# Patient Record
Sex: FEMALE | Race: WHITE | HISPANIC OR LATINO | Employment: UNEMPLOYED | URBAN - METROPOLITAN AREA
[De-identification: names, ages, dates, MRNs, and addresses within clinical notes are randomized per-mention and may not be internally consistent; named-entity substitution may affect disease eponyms.]

---

## 2016-10-21 LAB — PHYSICIAN READ PAP  881411: NORMAL

## 2017-08-31 ENCOUNTER — NON-PROVIDER VISIT (OUTPATIENT)
Dept: OBGYN | Facility: CLINIC | Age: 37
End: 2017-08-31
Payer: MEDICAID

## 2017-08-31 DIAGNOSIS — Z32.02 PREGNANCY EXAMINATION OR TEST, NEGATIVE RESULT: ICD-10-CM

## 2017-08-31 LAB
INT CON NEG: NEGATIVE
INT CON POS: POSITIVE
POC URINE PREGNANCY TEST: NEGATIVE

## 2017-08-31 PROCEDURE — 81025 URINE PREGNANCY TEST: CPT | Performed by: OBSTETRICS & GYNECOLOGY

## 2017-09-29 ENCOUNTER — GYNECOLOGY VISIT (OUTPATIENT)
Dept: OBGYN | Facility: CLINIC | Age: 37
End: 2017-09-29
Payer: MEDICAID

## 2017-09-29 VITALS
DIASTOLIC BLOOD PRESSURE: 90 MMHG | WEIGHT: 219 LBS | HEIGHT: 59 IN | BODY MASS INDEX: 44.15 KG/M2 | SYSTOLIC BLOOD PRESSURE: 140 MMHG

## 2017-09-29 DIAGNOSIS — N92.1 MENORRHAGIA WITH IRREGULAR CYCLE: ICD-10-CM

## 2017-09-29 PROCEDURE — 99203 OFFICE O/P NEW LOW 30 MIN: CPT | Performed by: OBSTETRICS & GYNECOLOGY

## 2017-09-29 NOTE — PROGRESS NOTES
" Lina Alfaro37 y.o.  female presents for abnormal uterine bleeding  Patient is complaining of twice monthly periods lasting between 5-7 days. Patient states she has large clots about the size of a tampon her first 4 days. She states she has to change for tampon every 30 minutes during the first few days of her cycle. Patient denies breakthrough bleeding or spotting. She states she has some pain the first few days of her menstrual cycle which she uses ibuprofen with good relief from her discomfort.    ROS: Patient is feeling well. No dyspnea or chest pain on exertion. No Abdominal pain, change in bowel habits, black or bloody stools. No urinary sx. GYN ROS:no breast pain or new or enlarging lumps on self exam.  No neurological complaints.  Past Medical History:   Diagnosis Date   • Allergy     see list.     None  Allergy:   Nkda [no known drug allergy]    LMP:       Patient's last menstrual period was 2017. .     Menstrual History: menses irregular: See history of present illness      Pap history, the patient denies abnormal Pap smears and denies   sexually transmitted diseases    Mammo:    Objective : The patient appears well, alert and oriented x 3, in no acute distress.  Blood pressure 140/90, height 1.499 m (4' 11\"), weight 99.3 kg (219 lb), last menstrual period 2017, not currently breastfeeding.  HEENT Exam: EOMI, ANGELIQUE, no adenopathy or thyromegaly.  Lungs: Clear to Auscultation   Cor: S1 and S2 normal, no murmurs, or rubs   Abdomen: Soft without tenderness, guarding mass or organomegaly.  Extremities: No edema, pulses equal  Neurological: Normal No focal signs  Breast Exam:deferred  Pelvic: negative findings: external genitalia normal, Bartholin's glands, urethra, [place]'s glands negative, vaginal mucosa normal, cervix clear, normal sized uterus, adnexae negative, exam limited by body habitus    Lab:No results found for this or any previous visit (from the past 336 " hour(s)).    Assessment: Abnormal uterine bleeding    Plan  CBC, TSH  Transvaginal pelvic ultrasound  Endometrial biopsy

## 2017-10-19 ENCOUNTER — HOSPITAL ENCOUNTER (OUTPATIENT)
Dept: LAB | Facility: MEDICAL CENTER | Age: 37
End: 2017-10-19
Attending: OBSTETRICS & GYNECOLOGY
Payer: MEDICAID

## 2017-10-19 DIAGNOSIS — N92.1 MENORRHAGIA WITH IRREGULAR CYCLE: ICD-10-CM

## 2017-10-19 LAB
BASOPHILS # BLD AUTO: 0.9 % (ref 0–1.8)
BASOPHILS # BLD: 0.06 K/UL (ref 0–0.12)
EOSINOPHIL # BLD AUTO: 0.14 K/UL (ref 0–0.51)
EOSINOPHIL NFR BLD: 2.1 % (ref 0–6.9)
ERYTHROCYTE [DISTWIDTH] IN BLOOD BY AUTOMATED COUNT: 51.2 FL (ref 35.9–50)
HCT VFR BLD AUTO: 36.3 % (ref 37–47)
HGB BLD-MCNC: 11.7 G/DL (ref 12–16)
IMM GRANULOCYTES # BLD AUTO: 0.02 K/UL (ref 0–0.11)
IMM GRANULOCYTES NFR BLD AUTO: 0.3 % (ref 0–0.9)
LYMPHOCYTES # BLD AUTO: 2.08 K/UL (ref 1–4.8)
LYMPHOCYTES NFR BLD: 31.7 % (ref 22–41)
MCH RBC QN AUTO: 27.7 PG (ref 27–33)
MCHC RBC AUTO-ENTMCNC: 32.2 G/DL (ref 33.6–35)
MCV RBC AUTO: 86 FL (ref 81.4–97.8)
MONOCYTES # BLD AUTO: 0.47 K/UL (ref 0–0.85)
MONOCYTES NFR BLD AUTO: 7.2 % (ref 0–13.4)
NEUTROPHILS # BLD AUTO: 3.8 K/UL (ref 2–7.15)
NEUTROPHILS NFR BLD: 57.8 % (ref 44–72)
NRBC # BLD AUTO: 0 K/UL
NRBC BLD AUTO-RTO: 0 /100 WBC
PLATELET # BLD AUTO: 285 K/UL (ref 164–446)
PMV BLD AUTO: 9.7 FL (ref 9–12.9)
RBC # BLD AUTO: 4.22 M/UL (ref 4.2–5.4)
TSH SERPL DL<=0.005 MIU/L-ACNC: 3.18 UIU/ML (ref 0.3–3.7)
WBC # BLD AUTO: 6.6 K/UL (ref 4.8–10.8)

## 2017-10-19 PROCEDURE — 36415 COLL VENOUS BLD VENIPUNCTURE: CPT

## 2017-10-19 PROCEDURE — 84443 ASSAY THYROID STIM HORMONE: CPT

## 2017-10-19 PROCEDURE — 85025 COMPLETE CBC W/AUTO DIFF WBC: CPT

## 2017-10-24 ENCOUNTER — HOSPITAL ENCOUNTER (OUTPATIENT)
Dept: RADIOLOGY | Facility: MEDICAL CENTER | Age: 37
End: 2017-10-24
Attending: OBSTETRICS & GYNECOLOGY
Payer: MEDICAID

## 2017-10-24 DIAGNOSIS — N92.1 MENORRHAGIA WITH IRREGULAR CYCLE: ICD-10-CM

## 2017-10-24 PROCEDURE — 76830 TRANSVAGINAL US NON-OB: CPT

## 2017-11-07 ENCOUNTER — HOSPITAL ENCOUNTER (OUTPATIENT)
Facility: MEDICAL CENTER | Age: 37
End: 2017-11-07
Attending: OBSTETRICS & GYNECOLOGY
Payer: MEDICAID

## 2017-11-07 ENCOUNTER — GYNECOLOGY VISIT (OUTPATIENT)
Dept: OBGYN | Facility: CLINIC | Age: 37
End: 2017-11-07
Payer: MEDICAID

## 2017-11-07 VITALS
BODY MASS INDEX: 44.76 KG/M2 | DIASTOLIC BLOOD PRESSURE: 74 MMHG | HEIGHT: 59 IN | SYSTOLIC BLOOD PRESSURE: 120 MMHG | WEIGHT: 222 LBS

## 2017-11-07 DIAGNOSIS — Z12.4 SCREENING FOR MALIGNANT NEOPLASM OF CERVIX: ICD-10-CM

## 2017-11-07 DIAGNOSIS — N93.9 ABNORMAL UTERINE BLEEDING: ICD-10-CM

## 2017-11-07 DIAGNOSIS — Z11.51 SCREENING FOR HPV (HUMAN PAPILLOMAVIRUS): ICD-10-CM

## 2017-11-07 LAB
INT CON NEG: NEGATIVE
INT CON POS: POSITIVE
PATHOLOGY CONSULT NOTE: NORMAL
POC URINE PREGNANCY TEST: NEGATIVE

## 2017-11-07 PROCEDURE — 88175 CYTOPATH C/V AUTO FLUID REDO: CPT

## 2017-11-07 PROCEDURE — 87624 HPV HI-RISK TYP POOLED RSLT: CPT

## 2017-11-07 PROCEDURE — 81025 URINE PREGNANCY TEST: CPT | Performed by: OBSTETRICS & GYNECOLOGY

## 2017-11-07 PROCEDURE — 88305 TISSUE EXAM BY PATHOLOGIST: CPT

## 2017-11-07 PROCEDURE — 58100 BIOPSY OF UTERUS LINING: CPT | Performed by: OBSTETRICS & GYNECOLOGY

## 2017-11-07 NOTE — NON-PROVIDER
EMB today  LMP: 10-9-17  Phone: [phone]  Pharmacy verified with patient  Urine HCG today - negative  US done on 10-24-17  EMB consent signed

## 2017-11-07 NOTE — PROGRESS NOTES
37-year-old  1-2 here today for endometrial biopsy secondary to menorrhagia. Patient is still complaining of regular 28 day menstrual cycles but she complains of heavy bleeding with clotting and cramps. She denies breakthrough bleeding. No other complaints today except the patient is kind of trying to get pregnant. She states that she is not preventing pregnancy nor is she actively attempting to conceive. She had an IUD until  during which time she had no abnormal vaginal bleeding.    Discussed with patient findings on ultrasonography which confirm a proximally 2 cm submucosal fibroid, possible adenomyosis, endometrial stripe 11.3 mm, normal ovaries    Indications for endometrial biopsy are discussed with patient. Risks associated with biopsy are discussed with patient. Informed consent is signed.  Urine pregnancy test is noted to be negative  Patient is placed in dorsal lithotomy position a speculum was inserted into the vagina  The cervix was evaluated and cleansed with Betadine swabs x3  Tenaculum was applied to the anterior lip of the cervix.  Dilators were used to access the endometrial cavity  Endometrial biopsy Pipelle was passed through the cervical os into the endometrial cavity x3  good sample is obtained  Endometrial cavity sounds to 10 cm  Instruments are removed from the vagina and cervix, hemostasis is achieved with silver nitrate  Patient tolerated the procedure well    Also discussed with patient today were results of CBC which shows mild anemia, recommend beginning iron    Pap smears also performed as patient states she has had no recent Pap smear  Patient follow-up in 3 weeks for review of results and further discussion of secondary infertility  Reviewed briefly with patient timed intercourse for increased chances of pregnancy

## 2017-11-08 LAB
CYTOLOGY REG CYTOL: NORMAL
HPV HR 12 DNA CVX QL NAA+PROBE: NEGATIVE
HPV16 DNA SPEC QL NAA+PROBE: NEGATIVE
HPV18 DNA SPEC QL NAA+PROBE: NEGATIVE
SPECIMEN SOURCE: NORMAL

## 2017-11-13 ENCOUNTER — NON-PROVIDER VISIT (OUTPATIENT)
Dept: OBGYN | Facility: CLINIC | Age: 37
End: 2017-11-13
Payer: MEDICAID

## 2017-11-13 DIAGNOSIS — Z32.01 POSITIVE URINE PREGNANCY TEST: ICD-10-CM

## 2017-11-13 LAB
INT CON NEG: NEGATIVE
INT CON POS: POSITIVE
POC URINE PREGNANCY TEST: POSITIVE

## 2017-11-13 PROCEDURE — 81025 URINE PREGNANCY TEST: CPT | Performed by: OBSTETRICS & GYNECOLOGY

## 2017-11-28 ENCOUNTER — GYNECOLOGY VISIT (OUTPATIENT)
Dept: OBGYN | Facility: CLINIC | Age: 37
End: 2017-11-28
Payer: MEDICAID

## 2017-11-28 ENCOUNTER — HOSPITAL ENCOUNTER (OUTPATIENT)
Dept: LAB | Facility: MEDICAL CENTER | Age: 37
End: 2017-11-28
Attending: OBSTETRICS & GYNECOLOGY
Payer: MEDICAID

## 2017-11-28 VITALS
SYSTOLIC BLOOD PRESSURE: 122 MMHG | DIASTOLIC BLOOD PRESSURE: 72 MMHG | HEIGHT: 59 IN | WEIGHT: 222 LBS | BODY MASS INDEX: 44.76 KG/M2

## 2017-11-28 DIAGNOSIS — N93.8 DUB (DYSFUNCTIONAL UTERINE BLEEDING): ICD-10-CM

## 2017-11-28 LAB
B-HCG SERPL-ACNC: ABNORMAL MIU/ML (ref 0–5)
IN CLINIC OB SCAN: NORMAL

## 2017-11-28 PROCEDURE — 36415 COLL VENOUS BLD VENIPUNCTURE: CPT

## 2017-11-28 PROCEDURE — 84702 CHORIONIC GONADOTROPIN TEST: CPT

## 2017-11-28 PROCEDURE — 99213 OFFICE O/P EST LOW 20 MIN: CPT | Mod: 25 | Performed by: OBSTETRICS & GYNECOLOGY

## 2017-11-28 PROCEDURE — 76830 TRANSVAGINAL US NON-OB: CPT | Performed by: OBSTETRICS & GYNECOLOGY

## 2017-11-29 NOTE — PROGRESS NOTES
"Lina Alfaro,  37 y.o.  female presents today with a C/O of :amenorrhea. Pt   Patient's last menstrual period was 10/09/2017.       Subjective : Nausea/Vomiting: No:  Abdominal /pelvic cramping : No :   vaginal bleeding:No  Patient had endometrial biopsy and negative UPT on  followed by positive urine pregnancy test a few days later.       GYN ROS:  no breast pain or new or enlarging lumps on self exam, no discharge or pelvic pain      Past Medical History:   Diagnosis Date   • Allergy     see list.       History reviewed. No pertinent surgical history.    Current Birth control:  none    OB History    Para Term  AB Living   5 2 1 1 2 2   SAB TAB Ectopic Molar Multiple Live Births   1 1       2      # Outcome Date GA Lbr Anthony/2nd Weight Sex Delivery Anes PTL Lv   5 Current            4  04/15/11 29w5d  1.446 kg (3 lb 3 oz) M Vag-Spont  Y ALICIA   3 TAB 2009 18w0d    TAB         Birth Comments: 2 day procedure, no complications.   2 Term 02 39w2d  2.778 kg (6 lb 2 oz) F Vag-Spont EPI  ALICIA      Birth Comments: no complications. ROM for 1 day not having ucs labor induced. baby stayed in hospital for 1 wk pt GBS +   1 SAB  9w0d    SAB         Birth Comments: No D&C              Allergy:      Nkda [no known drug allergy]    Exam;    /72   Ht 1.499 m (4' 11\")   Wt 100.7 kg (222 lb)   LMP 10/09/2017   BMI 44.84 kg/m²   Well-developed well-nourished female in no apparent distress  Heart regular rate and rhythm  Lungs clear to auscultation bilaterally  Breasts bilaterally normal with no dominant masses  Abdomen is soft and nontender    Normal external female genitalia  Cervix is closed thick and high  Uterus  12 centimeters  Adnexa are bilaterally nontender with no dominant masses    Lab.    No results found for this or any previous visit (from the past 336 hour(s)).  Ultrasound :     First trimester findings: Intrauterine gestational sac seen: " yes  Gestational sac summary: yolk sac seen  Adnexa: no masses seen uterus enlarged consistent with fibroids Probe type: vaginal  Ultrasound was performed and read by me      Assessment:    Intrauterine gestational sac is seen probable early IUP although can't confirm    Plan:  1 week  Beta hCG ×2

## 2017-11-30 ENCOUNTER — HOSPITAL ENCOUNTER (OUTPATIENT)
Dept: LAB | Facility: MEDICAL CENTER | Age: 37
End: 2017-11-30
Attending: OBSTETRICS & GYNECOLOGY
Payer: MEDICAID

## 2017-11-30 DIAGNOSIS — N93.8 DUB (DYSFUNCTIONAL UTERINE BLEEDING): ICD-10-CM

## 2017-11-30 LAB — B-HCG SERPL-ACNC: ABNORMAL MIU/ML (ref 0–5)

## 2017-11-30 PROCEDURE — 36415 COLL VENOUS BLD VENIPUNCTURE: CPT

## 2017-11-30 PROCEDURE — 84702 CHORIONIC GONADOTROPIN TEST: CPT

## 2017-12-07 ENCOUNTER — GYNECOLOGY VISIT (OUTPATIENT)
Dept: OBGYN | Facility: CLINIC | Age: 37
End: 2017-12-07
Payer: MEDICAID

## 2017-12-07 VITALS — SYSTOLIC BLOOD PRESSURE: 90 MMHG | WEIGHT: 221 LBS | BODY MASS INDEX: 44.64 KG/M2 | DIASTOLIC BLOOD PRESSURE: 54 MMHG

## 2017-12-07 DIAGNOSIS — O20.0 THREATENED ABORTION: ICD-10-CM

## 2017-12-07 LAB — IN CLINIC OB SCAN: NORMAL

## 2017-12-07 PROCEDURE — 76830 TRANSVAGINAL US NON-OB: CPT | Performed by: OBSTETRICS & GYNECOLOGY

## 2017-12-07 PROCEDURE — 99213 OFFICE O/P EST LOW 20 MIN: CPT | Mod: 25 | Performed by: OBSTETRICS & GYNECOLOGY

## 2017-12-07 NOTE — PROGRESS NOTES
Lina Mouna Alfaro,  37 y.o.  female presents today with a C/O of :amenorrhea. Pt   Patient's last menstrual period was 10/09/2017.       Subjective : Nausea/Vomiting: No:  Abdominal /pelvic cramping : No :   vaginal bleeding:No         GYN ROS:  no vaginal bleeding, no discharge or pelvic pain      Past Medical History:   Diagnosis Date   • Allergy     see list.       No past surgical history on file.    Current Birth control:  none    OB History    Para Term  AB Living   5 2 1 1 2 2   SAB TAB Ectopic Molar Multiple Live Births   1 1       2      # Outcome Date GA Lbr Anthony/2nd Weight Sex Delivery Anes PTL Lv   5 Current            4  04/15/11 29w5d  1.446 kg (3 lb 3 oz) M Vag-Spont  Y ALICIA   3 TAB 2009 18w0d    TAB         Birth Comments: 2 day procedure, no complications.   2 Term 02 39w2d  2.778 kg (6 lb 2 oz) F Vag-Spont EPI  ALICIA      Birth Comments: no complications. ROM for 1 day not having ucs labor induced. baby stayed in hospital for 1 wk pt GBS +   1 SAB  9w0d    SAB         Birth Comments: No D&C              Allergy:      Nkda [no known drug allergy]    Exam;    BP (!) 90/54   Wt 100.2 kg (221 lb)   LMP 10/09/2017   BMI 44.64 kg/m²   Well-developed well-nourished female in no apparent distress  Heart regular rate and rhythm  Lungs clear to auscultation bilaterally  Breasts bilaterally normal with no dominant masses  Abdomen is soft and nontender    Normal external female genitalia  Cervix is closed thick and high  Uterus  12 centimeters  Adnexa are bilaterally nontender with no dominant masses    Lab.    Recent Results (from the past 336 hour(s))   POCT US - In Clinic OB Scan    Collection Time: 17  4:27 PM   Result Value Ref Range    In Clinic OB Scan     HCG QUANTITATIVE    Collection Time: 17  4:30 PM   Result Value Ref Range    Bhcg 10,509.1 (H) 0.0 - 5.0 mIU/mL   HCG QUANTITATIVE    Collection Time: 17  3:32 PM   Result Value Ref  Range    Bhcg 15,939.7 (H) 0.0 - 5.0 mIU/mL     Ultrasound :     First trimester findings: Intrauterine gestational sac seen: yes  Gestational sac summary: fetal pole seen, yolk sac seen, fetal cardiac activity, EGA: 7 weeks + 2 days  Probe type: vaginal  Ultrasound was performed and read by me      Assessment:    Intrauterine gestation at 7 weeks and 2 /7 days, with EDC 7/25/18    Plan:  2 weeks  Repeat ultrasonography  Patient will need Eileen Bustam[facility] OB

## 2017-12-22 ENCOUNTER — INITIAL PRENATAL (OUTPATIENT)
Dept: OBGYN | Facility: CLINIC | Age: 37
End: 2017-12-22
Payer: MEDICAID

## 2017-12-22 ENCOUNTER — HOSPITAL ENCOUNTER (OUTPATIENT)
Facility: MEDICAL CENTER | Age: 37
End: 2017-12-22
Attending: OBSTETRICS & GYNECOLOGY
Payer: MEDICAID

## 2017-12-22 VITALS — BODY MASS INDEX: 44.64 KG/M2 | SYSTOLIC BLOOD PRESSURE: 102 MMHG | WEIGHT: 221 LBS | DIASTOLIC BLOOD PRESSURE: 70 MMHG

## 2017-12-22 DIAGNOSIS — Z11.3 SCREENING FOR VENEREAL DISEASE: ICD-10-CM

## 2017-12-22 DIAGNOSIS — O09.521 ELDERLY MULTIGRAVIDA IN FIRST TRIMESTER: ICD-10-CM

## 2017-12-22 DIAGNOSIS — O09.90 SUPERVISION OF HIGH RISK PREGNANCY, ANTEPARTUM: ICD-10-CM

## 2017-12-22 LAB
APPEARANCE UR: NORMAL
BILIRUB UR STRIP-MCNC: NORMAL MG/DL
COLOR UR AUTO: NORMAL
GLUCOSE UR STRIP.AUTO-MCNC: NEGATIVE MG/DL
KETONES UR STRIP.AUTO-MCNC: NEGATIVE MG/DL
LEUKOCYTE ESTERASE UR QL STRIP.AUTO: NEGATIVE
NITRITE UR QL STRIP.AUTO: NEGATIVE
PH UR STRIP.AUTO: 6.5 [PH] (ref 5–8)
PROT UR QL STRIP: NEGATIVE MG/DL
RBC UR QL AUTO: NEGATIVE
SP GR UR STRIP.AUTO: 1.01
UROBILINOGEN UR STRIP-MCNC: NORMAL MG/DL

## 2017-12-22 PROCEDURE — 87491 CHLMYD TRACH DNA AMP PROBE: CPT

## 2017-12-22 PROCEDURE — 87591 N.GONORRHOEAE DNA AMP PROB: CPT

## 2017-12-22 PROCEDURE — 81002 URINALYSIS NONAUTO W/O SCOPE: CPT | Performed by: OBSTETRICS & GYNECOLOGY

## 2017-12-22 PROCEDURE — 59402 PR NEW OB HIGH RISK: CPT | Performed by: OBSTETRICS & GYNECOLOGY

## 2017-12-22 NOTE — LETTER
Cystic Fibrosis Carrier Testing  Lina Alfaro    The following information is about a blood test that can be done to determine if you and/or your partner carry the gene for cystic fibrosis.    WHAT IS CYSTIC FIBROSIS?  · Cystic fibrosis (CF) is an inherited disease that affects more than 25,000 American children and young adults.  · Symptoms of CF vary but include lung congestion, pneumonia, diarrhea and poor growth.  Most people with CF have severe medical problems and some die at a young age.  Others have so few symptoms they are unaware they have CF.  · CF does not affect intelligence.  · Although there is no cure for CF at this time, scientists are making progress in improving treatment and in searching for a cure.  In the past many people with CF  at a very young age.  Today, many are living into their 20’s and 30’s.    IS THERE A CHANCE MY BABY COULD HAVE CYSTIC FIBROSIS?  · You can have a child with CF even if there is no history in your family (see chart below).  · CF testing can help determine if you are a carrier and at risk to have a child with CF.  Note: if both parents are carriers, there is a 1 in 4 (25%) chance with each pregnancy that they will have a child with CF.  · Carriers have one normal CF gene and one altered CF gene.  · People with CF have two altered CF genes.  · Most people have two normal copies of the CF gene.    Approximate risk that a couple with no family history of cystic fibrosis will have a child with cystic fibrosis:    Ethnic background / Risk     couple:  1 in 2,500   couple:  1 in 15,000            couple:  1 in 8,000     American couple:  1 in 32,000     WHAT TESTING IS AVAILABLE?  · There is a blood test that can be done to find out if you or your partner is a carrier.  · It is important to understand that CF carrier testing does not detect all CF carriers.  · If the test shows that you are both CF carriers, you unborn  baby can be tested to find out if the baby has CF.    HOW MUCH DOES IT COST TO HAVE CYSTIC FIBROSIS CARRIER TESTING?  · Cost and insurance coverage for CF carrier testing vary depending upon the laboratory used and your insurance policy.  · The average cost for CF carrier testing is $300 per person.  · Your genetic counselor can provide you with more information about cystic fibrosis carrier testing.    _____  Yes, I am interested in discussing carrier testing with a genetic counselor.    _____  No, I am not interested in CF carrier testing or in receiving more information about CF carrier testing.      Client signature: ________________________________________  12/22/2017

## 2017-12-22 NOTE — PROGRESS NOTES
Chief complaint, new OB visit    Lina Mouna Alfaro is a 37 y.o.  at 10w4d here today for obstetrical visit.  Patient is without complaints.    She reports no fetal movement.  She denies vaginal bleeding.  She denies rupture of membranes.  She denies contractions.     has High-risk pregnancy;  labor in second trimester with  delivery in third trimester; and Elderly multigravida in first trimester on her problem list.    Past medical history is reviewed and updated  Past surgical history is reviewed and updated  Medications reviewed and updated  Allergies reviewed and updated  Social history reviewed and updated  Family history reviewed and updated    /70   Wt 100.2 kg (221 lb)   LMP 10/09/2017   BMI 44.64 kg/m²   PELVIC EXAM:  Normal external genitalia  Spec: cervix has no lesions, minimal whitish discharge  Vaginal wall no lesions  BME: cervix - closed and firm, non-tender            Uterus - 10 weeks, non-tender            Adnexae - no masses, non-tender      A/P IUP at 10w4d  AFP will send to [facility]  1 hour glucola   Rhogam a pos  GBS   History of  labor patient is a candidate for [place]  Patient needs referral to perinatology for advanced maternal age history of  labor will need early cervical length screening    F/U in 4 weeks

## 2017-12-22 NOTE — PROGRESS NOTES
Pt here today for New OB visit   Planned Pregnancy   Pt started going to the gym and would like to know if that is ok  Good # [phone]  Desires [facility]  Pharmacy verified

## 2017-12-23 DIAGNOSIS — Z11.3 SCREENING FOR VENEREAL DISEASE: ICD-10-CM

## 2017-12-23 LAB
C TRACH DNA SPEC QL NAA+PROBE: NEGATIVE
N GONORRHOEA DNA SPEC QL NAA+PROBE: NEGATIVE
SPECIMEN SOURCE: NORMAL

## 2017-12-27 NOTE — PROGRESS NOTES
Referral faxed to [facility] on 12/27/17.  They will contact patient to schedule appt.  Please check with patient if appt was made/ document. Thank you

## 2018-01-05 ENCOUNTER — TELEPHONE (OUTPATIENT)
Dept: OBGYN | Facility: CLINIC | Age: 38
End: 2018-01-05

## 2018-01-06 ENCOUNTER — HOSPITAL ENCOUNTER (OUTPATIENT)
Dept: LAB | Facility: MEDICAL CENTER | Age: 38
End: 2018-01-06
Attending: OBSTETRICS & GYNECOLOGY
Payer: MEDICAID

## 2018-01-06 DIAGNOSIS — O09.521 ELDERLY MULTIGRAVIDA IN FIRST TRIMESTER: ICD-10-CM

## 2018-01-06 LAB
ABO GROUP BLD: NORMAL
APPEARANCE UR: CLEAR
BACTERIA #/AREA URNS HPF: ABNORMAL /HPF
BASOPHILS # BLD AUTO: 0.3 % (ref 0–1.8)
BASOPHILS # BLD: 0.02 K/UL (ref 0–0.12)
BILIRUB UR QL STRIP.AUTO: NEGATIVE
BLD GP AB SCN SERPL QL: NORMAL
COLOR UR: YELLOW
CULTURE IF INDICATED INDCX: NO UA CULTURE
EOSINOPHIL # BLD AUTO: 0.38 K/UL (ref 0–0.51)
EOSINOPHIL NFR BLD: 6.3 % (ref 0–6.9)
EPI CELLS #/AREA URNS HPF: ABNORMAL /HPF
ERYTHROCYTE [DISTWIDTH] IN BLOOD BY AUTOMATED COUNT: 47.6 FL (ref 35.9–50)
GLUCOSE 1H P 50 G GLC PO SERPL-MCNC: 236 MG/DL (ref 70–139)
GLUCOSE UR STRIP.AUTO-MCNC: NEGATIVE MG/DL
HBV SURFACE AG SER QL: NEGATIVE
HCT VFR BLD AUTO: 36.3 % (ref 37–47)
HGB BLD-MCNC: 11.7 G/DL (ref 12–16)
HIV 1+2 AB+HIV1 P24 AG SERPL QL IA: NON REACTIVE
HYALINE CASTS #/AREA URNS LPF: ABNORMAL /LPF
IMM GRANULOCYTES # BLD AUTO: 0.01 K/UL (ref 0–0.11)
IMM GRANULOCYTES NFR BLD AUTO: 0.2 % (ref 0–0.9)
KETONES UR STRIP.AUTO-MCNC: NEGATIVE MG/DL
LEUKOCYTE ESTERASE UR QL STRIP.AUTO: NEGATIVE
LYMPHOCYTES # BLD AUTO: 1.49 K/UL (ref 1–4.8)
LYMPHOCYTES NFR BLD: 24.5 % (ref 22–41)
MCH RBC QN AUTO: 27.7 PG (ref 27–33)
MCHC RBC AUTO-ENTMCNC: 32.2 G/DL (ref 33.6–35)
MCV RBC AUTO: 86 FL (ref 81.4–97.8)
MICRO URNS: ABNORMAL
MONOCYTES # BLD AUTO: 0.32 K/UL (ref 0–0.85)
MONOCYTES NFR BLD AUTO: 5.3 % (ref 0–13.4)
NEUTROPHILS # BLD AUTO: 3.86 K/UL (ref 2–7.15)
NEUTROPHILS NFR BLD: 63.4 % (ref 44–72)
NITRITE UR QL STRIP.AUTO: NEGATIVE
NRBC # BLD AUTO: 0 K/UL
NRBC BLD-RTO: 0 /100 WBC
PH UR STRIP.AUTO: 6.5 [PH]
PLATELET # BLD AUTO: 310 K/UL (ref 164–446)
PMV BLD AUTO: 9.9 FL (ref 9–12.9)
PROT UR QL STRIP: NEGATIVE MG/DL
RBC # BLD AUTO: 4.22 M/UL (ref 4.2–5.4)
RBC # URNS HPF: ABNORMAL /HPF
RBC UR QL AUTO: ABNORMAL
RH BLD: NORMAL
RUBV AB SER QL: 104.7 IU/ML
SP GR UR STRIP.AUTO: 1.02
TREPONEMA PALLIDUM IGG+IGM AB [PRESENCE] IN SERUM OR PLASMA BY IMMUNOASSAY: NON REACTIVE
UROBILINOGEN UR STRIP.AUTO-MCNC: 0.2 MG/DL
WBC # BLD AUTO: 6.1 K/UL (ref 4.8–10.8)
WBC #/AREA URNS HPF: ABNORMAL /HPF

## 2018-01-06 PROCEDURE — 86900 BLOOD TYPING SEROLOGIC ABO: CPT

## 2018-01-06 PROCEDURE — 86780 TREPONEMA PALLIDUM: CPT

## 2018-01-06 PROCEDURE — 87340 HEPATITIS B SURFACE AG IA: CPT

## 2018-01-06 PROCEDURE — 86762 RUBELLA ANTIBODY: CPT

## 2018-01-06 PROCEDURE — 85025 COMPLETE CBC W/AUTO DIFF WBC: CPT

## 2018-01-06 PROCEDURE — 82950 GLUCOSE TEST: CPT

## 2018-01-06 PROCEDURE — 86901 BLOOD TYPING SEROLOGIC RH(D): CPT

## 2018-01-06 PROCEDURE — 81001 URINALYSIS AUTO W/SCOPE: CPT

## 2018-01-06 PROCEDURE — 86850 RBC ANTIBODY SCREEN: CPT

## 2018-01-06 PROCEDURE — 36415 COLL VENOUS BLD VENIPUNCTURE: CPT

## 2018-01-06 PROCEDURE — 87389 HIV-1 AG W/HIV-1&-2 AB AG IA: CPT

## 2018-01-08 ENCOUNTER — HOSPITAL ENCOUNTER (EMERGENCY)
Facility: MEDICAL CENTER | Age: 38
End: 2018-01-08
Payer: MEDICAID

## 2018-01-08 VITALS
OXYGEN SATURATION: 97 % | WEIGHT: 220.02 LBS | RESPIRATION RATE: 18 BRPM | HEART RATE: 108 BPM | BODY MASS INDEX: 46.18 KG/M2 | DIASTOLIC BLOOD PRESSURE: 79 MMHG | SYSTOLIC BLOOD PRESSURE: 122 MMHG | TEMPERATURE: 97.4 F | HEIGHT: 58 IN

## 2018-01-08 PROCEDURE — 302449 STATCHG TRIAGE ONLY (STATISTIC)

## 2018-01-08 ASSESSMENT — PAIN SCALES - GENERAL: PAINLEVEL_OUTOF10: 3

## 2018-01-09 ENCOUNTER — TELEPHONE (OUTPATIENT)
Dept: OBGYN | Facility: CLINIC | Age: 38
End: 2018-01-09

## 2018-01-09 NOTE — TELEPHONE ENCOUNTER
----- Message from Autumn Botello M.D. sent at 1/7/2018  5:31 PM PST -----  Patient has DM please send to Diabetes clinic for diet and meter  1/9/18@ 10:32am. N/a, msg left for patient to call back.  Patient called back, states that she went to the ER yesterday but decided to leave because it was 6 hrs waiting, she is having vaginal bleeding on and off since Friday. Some lower abdominal cramping, Tylenol helping some. Right now is only spotting and light cramping. Consulted with Dr Botello she advised to schedule patient to see her next Monday meanwhile if she has heavy vaginal bleeding where she is soaking a pad in less than 1 hr will need to go to the ER.

## 2018-01-09 NOTE — ED NOTES
"Patient signed \"Leaving prior to physician contact\" form  Tech encouraged to stay but patient refused.  "

## 2018-01-09 NOTE — ED NOTES
"Chief Complaint   Patient presents with   • Pregnancy     12 weeks.  P:2.   • Vaginal Bleeding     \"I've been spotting since Friday. Then I started bleeding more when I woke up this morning. Then I had a blood clot come out in the toilet and started cramping after that\".     • Abdominal Cramping     mild cramping 3/10     Pt am to triage with above.   Denies saturating pads, was concerned when she saw the clots.   Pt returned to [facility]. Educated on triage process and to inform staff of any changes.     /79   Pulse (!) 108   Temp 36.3 °C (97.4 °F)   Resp 18   Ht 1.473 m (4' 10\")   Wt 99.8 kg (220 lb 0.3 oz)   LMP 10/09/2017   SpO2 97%   BMI 45.98 kg/m²     "

## 2018-01-10 ENCOUNTER — TELEPHONE (OUTPATIENT)
Dept: OBGYN | Facility: CLINIC | Age: 38
End: 2018-01-10

## 2018-01-10 ENCOUNTER — HOSPITAL ENCOUNTER (OUTPATIENT)
Facility: MEDICAL CENTER | Age: 38
End: 2018-01-10
Attending: OBSTETRICS & GYNECOLOGY
Payer: MEDICAID

## 2018-01-10 ENCOUNTER — ROUTINE PRENATAL (OUTPATIENT)
Dept: OBGYN | Facility: CLINIC | Age: 38
End: 2018-01-10
Payer: MEDICAID

## 2018-01-10 VITALS — WEIGHT: 219 LBS | SYSTOLIC BLOOD PRESSURE: 104 MMHG | BODY MASS INDEX: 45.77 KG/M2 | DIASTOLIC BLOOD PRESSURE: 68 MMHG

## 2018-01-10 DIAGNOSIS — O24.911 DIABETES MELLITUS AFFECTING PREGNANCY IN FIRST TRIMESTER: ICD-10-CM

## 2018-01-10 DIAGNOSIS — O03.9 SAB (SPONTANEOUS ABORTION): ICD-10-CM

## 2018-01-10 DIAGNOSIS — F43.21 GRIEF: ICD-10-CM

## 2018-01-10 DIAGNOSIS — R63.8 INCREASED BMI: ICD-10-CM

## 2018-01-10 PROCEDURE — 88305 TISSUE EXAM BY PATHOLOGIST: CPT

## 2018-01-10 PROCEDURE — 99213 OFFICE O/P EST LOW 20 MIN: CPT | Mod: 25 | Performed by: OBSTETRICS & GYNECOLOGY

## 2018-01-10 PROCEDURE — 76817 TRANSVAGINAL US OBSTETRIC: CPT | Performed by: OBSTETRICS & GYNECOLOGY

## 2018-01-10 RX ORDER — METHYLERGONOVINE MALEATE 0.2 MG/1
0.2 TABLET ORAL EVERY 6 HOURS
Qty: 4 TAB | Refills: 0 | Status: SHIPPED | OUTPATIENT
Start: 2018-01-10 | End: 2018-01-10

## 2018-01-10 RX ORDER — METHYLERGONOVINE MALEATE 0.2 MG/1
0.2 TABLET ORAL EVERY 6 HOURS
Qty: 4 TAB | Refills: 0 | Status: SHIPPED | OUTPATIENT
Start: 2018-01-10 | End: 2019-04-14

## 2018-01-10 NOTE — PROGRESS NOTES
CC - fit in. Called [facility] because of passage of POC    History of present illness:   37 y.o.  12 weeks called [facility] today because of vaginal bleeding with passage of what seemed like the fetus   retrieved fetus and placental tissue from the toilet  (+) pelvic cramping  She is very sad - requesting counseling. (+) good support - her  and daughter    Review of systems:  Pertinent positives documented in HPI and all other systems reviewed & are negative    All PMH, PSH, allergies, social history and FH reviewed and updated today:  Past Medical History:   Diagnosis Date   • Allergy     see list.       No past surgical history on file.    Allergies:   Allergies   Allergen Reactions   • Nkda [No Known Drug Allergy]        Social History     Social History   • Marital status: Single     Spouse name: N/A   • Number of children: N/A   • Years of education: N/A     Occupational History   • Not on file.     Social History Main Topics   • Smoking status: Former Smoker     Packs/day: 0.25     Types: Cigarettes     Quit date: 6/15/2010   • Smokeless tobacco: Never Used      Comment: only smoked for 2 months.2 cigs a day.   • Alcohol use No   • Drug use: No   • Sexual activity: Yes     Partners: Male      Comment: none. Planned pregnancy     Other Topics Concern   • Not on file     Social History Narrative   • No narrative on file       Family History   Problem Relation Age of Onset   • Diabetes Mother    • Diabetes Maternal Grandmother    • Alcohol/Drug Father      Alcoholic abuse   • Diabetes Maternal Aunt    • Hypertension Paternal Aunt    • Diabetes Maternal Aunt        Physical exam:  Blood pressure 104/68, weight 99.3 kg (219 lb), last menstrual period 10/09/2017.    General:appears stated age, is in no apparent distress, is well developed and well nourished  Abdomen: Bowel sounds positive, nondistended, soft, nontender x4, no rebound or guarding. No organomegaly. No masses.  Female GYN: cervix - closed    Skin: No rashes, or ulcers or lesions seen  Psychiatric: Patient shows appropriate affect, is alert and oriented x3, intact judgment and insight.  1. SAB (spontaneous )  methylergonovine (METHERGINE) 0.2 MG Tab    REFERRAL TO BEHAVIORAL HEALTH    REFERRAL TO FAMILY PRACTICE    REFERRAL TO [facility] (HIP) Services Requested: Registered Dietitian for Medical Nutrition Therapy, Medical Weight Management Program; Reason for Referral? BMI>25 and 1 or more co-morbidities, including DM2, HTN, steatosis...    PATHOLOGY SPECIMEN   2. Diabetes mellitus affecting pregnancy in first trimester  REFERRAL TO BEHAVIORAL HEALTH    REFERRAL TO FAMILY PRACTICE    REFERRAL TO [facility] (HIP) Services Requested: Registered Dietitian for Medical Nutrition Therapy, Medical Weight Management Program; Reason for Referral? BMI>25 and 1 or more co-morbidities, including DM2, HTN, steatosis...   3. Grief  REFERRAL TO BEHAVIORAL HEALTH   4. Increased BMI  REFERRAL TO FAMILY PRACTICE    REFERRAL TO [facility] (HIP) Services Requested: Registered Dietitian for Medical Nutrition Therapy, Medical Weight Management Program; Reason for Referral? BMI>25 and 1 or more co-morbidities, including DM2, HTN, steatosis...   5. Specimen brought by pt and  - fetus, POC seen - grossly. They want to bury the fetus. POC sent to pathology  6. Weight loss  7. Establish with PCP to take care of her medical issues  8. Annual gyn/PRN

## 2018-01-10 NOTE — TELEPHONE ENCOUNTER
"Pt called triage line stating yesterday 1/9/18 at around 12:30 noon she felt something coming out of her vaginal after urinating states her  pulled it out of the toilet and it looked like \"baby body parts\". She also states she felt like her placenta came out. Asked pt if she went to [facility] ER for further evaluation states that she went earlier that day but there was a 6 hour wait and she went home and this is when everything happened. She is now having slight bleeding denies cramping, or fever taking Tylenol and Ibuprofen which helps but what she is concerned about is that she saw a tissue that come out of her vagina that looks like her ovary. Instructed pt that [facility] but pt refuses wants to know why we will not see her in clinic per consult with Dr. Linden Coffman to have pt be seen today at 10:30 am as a fit in to confirm if she has passed products of conception pt verbalized understanding and will comply.   "

## 2018-01-10 NOTE — PROGRESS NOTES
Pt here today for OB follow up/for possible miscarriage.  Pt states yesterday @12:30pm water broke, then passed baby. Afterward passed placenta, and lost some more products this morning.   Reports NO FM  Good # [phone]  Pharmacy Confirmed.

## 2018-01-12 ENCOUNTER — APPOINTMENT (OUTPATIENT)
Dept: HEALTH INFORMATION MANAGEMENT | Facility: MEDICAL CENTER | Age: 38
End: 2018-01-12
Payer: MEDICAID

## 2019-04-13 ENCOUNTER — HOSPITAL ENCOUNTER (EMERGENCY)
Facility: MEDICAL CENTER | Age: 39
End: 2019-04-13
Attending: EMERGENCY MEDICINE
Payer: MEDICAID

## 2019-04-13 VITALS
OXYGEN SATURATION: 96 % | DIASTOLIC BLOOD PRESSURE: 64 MMHG | HEIGHT: 59 IN | BODY MASS INDEX: 44.15 KG/M2 | WEIGHT: 219 LBS | SYSTOLIC BLOOD PRESSURE: 110 MMHG | RESPIRATION RATE: 17 BRPM | TEMPERATURE: 98.8 F | HEART RATE: 68 BPM

## 2019-04-13 DIAGNOSIS — J40 BRONCHITIS: ICD-10-CM

## 2019-04-13 PROCEDURE — 99281 EMR DPT VST MAYX REQ PHY/QHP: CPT

## 2019-04-13 RX ORDER — AZITHROMYCIN 250 MG/1
TABLET, FILM COATED ORAL
Qty: 6 TAB | Refills: 0 | Status: SHIPPED | OUTPATIENT
Start: 2019-04-13 | End: 2023-12-26

## 2019-04-13 RX ORDER — ALBUTEROL SULFATE 90 UG/1
2 AEROSOL, METERED RESPIRATORY (INHALATION) EVERY 6 HOURS PRN
Qty: 1 INHALER | Refills: 3 | Status: SHIPPED | OUTPATIENT
Start: 2019-04-13 | End: 2023-12-26

## 2019-04-13 RX ORDER — ALBUTEROL SULFATE 90 UG/1
2 AEROSOL, METERED RESPIRATORY (INHALATION) EVERY 6 HOURS PRN
Qty: 1 INHALER | Refills: 3 | Status: SHIPPED | OUTPATIENT
Start: 2019-04-13 | End: 2019-04-13

## 2019-04-13 RX ORDER — METHYLPREDNISOLONE 4 MG/1
TABLET ORAL
Qty: 1 KIT | Refills: 0 | Status: SHIPPED | OUTPATIENT
Start: 2019-04-13 | End: 2019-04-13

## 2019-04-13 RX ORDER — METHYLPREDNISOLONE 4 MG/1
TABLET ORAL
Qty: 1 KIT | Refills: 0 | Status: SHIPPED | OUTPATIENT
Start: 2019-04-13 | End: 2023-12-26

## 2019-04-13 RX ORDER — AZITHROMYCIN 250 MG/1
TABLET, FILM COATED ORAL
Qty: 6 TAB | Refills: 0 | Status: SHIPPED | OUTPATIENT
Start: 2019-04-13 | End: 2019-04-13

## 2019-04-13 NOTE — ED PROVIDER NOTES
"ED Provider Note    Scribed for No att. providers found by Durga Chavez. 4/13/2019  10:11 AM    Primary care provider: Pcp Pt States None  Means of arrival: Walk-In  History obtained from: Patient  History limited by: None    CHIEF COMPLAINT  Chief Complaint   Patient presents with   • Flu Like Symptoms       HPI  Lina Mouna Alfaro is a 39 y.o. female who presents to the Emergency Department for cough onset 3-4 days ago with associated nasal congestion and posterior chest wall pain. She reports colored sputum and nasal discharge. The patient has not smoked cigarettes for the last month and denies any chance of pregnancy.     REVIEW OF SYSTEMS  Pertinent positives include: cough, posterior chest wall pain, and nasal congestion  Pertinent negatives include: Ear pain  See HPI for further details.       ALLERGIES  Allergies   Allergen Reactions   • Nkda [No Known Drug Allergy]      CURRENT MEDICATIONS  Home Medications    **Home medications have not yet been reviewed for this encounter**       PAST MEDICAL HISTORY   has a past medical history of Allergy.    SURGICAL HISTORY  patient denies any surgical history    SOCIAL HISTORY  Social History   Substance Use Topics   • Smoking status: Former Smoker     Packs/day: 0.25     Types: Cigarettes     Quit date: 6/15/2010   • Smokeless tobacco: Never Used      Comment: only smoked for 2 months.2 cigs a day.   • Alcohol use No      History   Drug Use No       FAMILY HISTORY  Family History   Problem Relation Age of Onset   • Diabetes Mother    • Diabetes Maternal Grandmother    • Alcohol/Drug Father         Alcoholic abuse   • Diabetes Maternal Aunt    • Hypertension Paternal Aunt    • Diabetes Maternal Aunt          PHYSICAL EXAM  VITAL SIGNS: /64   Pulse 68   Temp 37.1 °C (98.8 °F) (Temporal)   Resp 17   Ht 1.499 m (4' 11\")   Wt 99.3 kg (219 lb)   SpO2 96%   BMI 44.23 kg/m²   Constitutional: Well-nourished, Well developed. In mild distress.   Head: " Normocephalic, Atraumatic  Eyes: PERRL, sclera anicteric, EOMI, no lid swelling  ENT: Clear nasal discharge. No epistaxis. No facial deformity. Mucous membranes are moist.   Cardiovascular: Regular rate and rhythm without S3,S4, or murmur.    Lungs: No respiratory distress. Productive sounding cough with scant wheezes in the bases.  Psychiatric: Cooperative. Appropriate mood and affect.     COURSE & MEDICAL DECISION MAKING:  Nursing notes, VS, PMSFHx reviewed in chart.     10:11 AM - Patient seen and examined at bedside. I discussed that she has slight wheezes and will give her a prescription for an albuterol inhaler as well as a steroid to reduce inflammation. I also prescribed Azithromycin to treat the upper respiratory illness. I informed her that these should also help with her back pain. I discussed plan for discharge and the patient understands and agrees.        FINAL IMPRESSION:  1. Bronchitis         The patient will return for new or worsening symptoms and is stable at the time of discharge.    The patient is referred to a primary physician for blood pressure management, diabetic screening, and for all other preventative health concerns.    DISPOSITION:  Patient will be discharged home in stable condition.    FOLLOW UP:  [facility], Emergency Dept  [address] [address] [phone]    If symptoms worsen      OUTPATIENT MEDICATIONS:  Discharge Medication List as of 4/13/2019 10:44 AM         IDurga (Scribe), am scribing for, and in the presence of, No att. providers found.    Electronically signed by: Durga Chavez (Franck), 4/13/2019    I, No att. providers found personally performed the services described in this documentation, as scribed by Durga Chavez in my presence, and it is both accurate and complete.      [unfilled]

## 2019-04-14 ENCOUNTER — HOSPITAL ENCOUNTER (EMERGENCY)
Facility: MEDICAL CENTER | Age: 39
End: 2019-04-14
Attending: EMERGENCY MEDICINE
Payer: MEDICAID

## 2019-04-14 VITALS
SYSTOLIC BLOOD PRESSURE: 163 MMHG | HEIGHT: 59 IN | WEIGHT: 209 LBS | TEMPERATURE: 98.2 F | DIASTOLIC BLOOD PRESSURE: 88 MMHG | RESPIRATION RATE: 18 BRPM | HEART RATE: 101 BPM | OXYGEN SATURATION: 96 % | BODY MASS INDEX: 42.13 KG/M2

## 2019-04-14 DIAGNOSIS — J45.41 MODERATE PERSISTENT REACTIVE AIRWAY DISEASE WITH ACUTE EXACERBATION: ICD-10-CM

## 2019-04-14 PROCEDURE — 99283 EMERGENCY DEPT VISIT LOW MDM: CPT

## 2019-04-14 RX ORDER — BENZONATATE 100 MG/1
100 CAPSULE ORAL 3 TIMES DAILY PRN
Qty: 60 CAP | Refills: 0 | Status: SHIPPED | OUTPATIENT
Start: 2019-04-14 | End: 2023-12-26

## 2019-04-14 ASSESSMENT — LIFESTYLE VARIABLES: DO YOU DRINK ALCOHOL: NO

## 2019-04-15 NOTE — DISCHARGE INSTRUCTIONS
Continue the albuterol    Take the steroids as directed    Return if you are acutely worse and did not have significant improvement in 7-10 days

## 2019-04-15 NOTE — ED PROVIDER NOTES
"ED Provider Note    CHIEF COMPLAINT  Chief Complaint   Patient presents with   • Cough     ongoing for 4 days reports productive dark cough.  seen here yesterday, dx with bronchitis, did not receive xray, has been taking steroids and abx as prescribed but is not any better       HPI  Lina Alfaro is a 39 y.o. female who presents with a cough.  The patient's been sick over the last 4-5 days.  She has had congestion, rhinorrhea, as well as a productive cough.  She was seen here yesterday and diagnosed with bronchitis and placed on azithromycin, prednisone, and albuterol.  The patient states today she feels even worse.  She does not have any pain or swelling to her lower extremities.  She has not any vomiting.  She states that she quit smoking in the past and she does not have any history of asthma.    REVIEW OF SYSTEMS  No leg pain or swelling, no abdominal pain, no rashes    PHYSICAL EXAM  VITAL SIGNS: BP (!) 163/88   Pulse (!) 106   Temp 36.6 °C (97.8 °F) (Temporal)   Resp 18   Ht 1.499 m (4' 11\")   Wt 94.8 kg (208 lb 15.9 oz)   LMP 04/01/2019   SpO2 95%   BMI 42.21 kg/m²   In general the patient does not appear toxic  Nares have swollen turbinates with rhinorrhea, tympanic members intact and nonerythematous, oropharynx nonerythematous  Pulmonary the patient has mild diffuse wheezing, no retractions, no tachypnea  Cardiovascular S1-S2 with a slightly tachycardic rate  GI abdomen soft  Extremities no significant edema    COURSE & MEDICAL DECISION MAKING  Pertinent Labs & Imaging studies reviewed. (See chart for details)  This a 39-year-old female who presents the emerge department with a cough and difficulty breathing.  Clinically on my exam she does have significant evidence of reactive airway disease.  The patient is instructed to continue the steroids as well as the albuterol.  I told her there is no indication for chest x-ray at this time if she is not hypoxic nor does she have any focal " asymmetry to auscultation to support pneumonia.  Furthermore the patient is already on antibiotics.  The patient is aware this could take another 7-10 days before she is better.  She will continue utilize the albuterol.  I will give the patient a prescription for Tessalon to help her with her cough at night.  FINAL IMPRESSION  1.  Upper respiratory infection  2.  Reactive airway disease       Disposition  The patient will be discharged in stable condition      Electronically signed by: Nestor Berrios, 4/14/2019 9:12 PM

## 2019-04-15 NOTE — ED TRIAGE NOTES
Lina Alfaro  39 y.o.  Chief Complaint   Patient presents with   • Cough     ongoing for 4 days reports productive dark cough.  seen here yesterday, dx with bronchitis, did not receive xray, has been taking steroids and abx as prescribed but is not any better     Patient ambulatory with steady gait to triage room with above complaint.  Patient appears in moderate discomfort.  Patient continously coughing in triage, non productive at this time.  [facility] [place] [facility] told her to come back if she wasn't improving.  No tests were done on her yesterday.  [facility] [place] has been taking several inhalers today.      Patient escorted to the lobby and instructed to notify staff of any changes in condition.

## 2019-04-15 NOTE — ED NOTES
Pt discharged with one paper prescription; Pt verbalized understanding of discharge education and medication information. Pt provided face masks per request. Pt ambulated to lobby with steady gait.

## 2023-09-13 ENCOUNTER — APPOINTMENT (OUTPATIENT)
Dept: RADIOLOGY | Facility: MEDICAL CENTER | Age: 43
End: 2023-09-13
Attending: EMERGENCY MEDICINE
Payer: COMMERCIAL

## 2023-09-13 ENCOUNTER — HOSPITAL ENCOUNTER (EMERGENCY)
Facility: MEDICAL CENTER | Age: 43
End: 2023-09-13
Attending: EMERGENCY MEDICINE
Payer: COMMERCIAL

## 2023-09-13 VITALS
DIASTOLIC BLOOD PRESSURE: 88 MMHG | OXYGEN SATURATION: 97 % | BODY MASS INDEX: 47.07 KG/M2 | TEMPERATURE: 97.4 F | SYSTOLIC BLOOD PRESSURE: 125 MMHG | RESPIRATION RATE: 16 BRPM | HEART RATE: 100 BPM | WEIGHT: 233.47 LBS | HEIGHT: 59 IN

## 2023-09-13 DIAGNOSIS — U07.1 COVID-19: ICD-10-CM

## 2023-09-13 DIAGNOSIS — D64.9 ANEMIA, UNSPECIFIED TYPE: ICD-10-CM

## 2023-09-13 DIAGNOSIS — Z87.42 HISTORY OF MENORRHAGIA: ICD-10-CM

## 2023-09-13 DIAGNOSIS — J98.01 ACUTE BRONCHOSPASM: ICD-10-CM

## 2023-09-13 LAB
ALBUMIN SERPL BCP-MCNC: 4.2 G/DL (ref 3.2–4.9)
ALBUMIN/GLOB SERPL: 1.2 G/DL
ALP SERPL-CCNC: 77 U/L (ref 30–99)
ALT SERPL-CCNC: 52 U/L (ref 2–50)
ANION GAP SERPL CALC-SCNC: 12 MMOL/L (ref 7–16)
ANISOCYTOSIS BLD QL SMEAR: ABNORMAL
AST SERPL-CCNC: 42 U/L (ref 12–45)
BASOPHILS # BLD AUTO: 0.5 % (ref 0–1.8)
BASOPHILS # BLD: 0.04 K/UL (ref 0–0.12)
BILIRUB SERPL-MCNC: 0.4 MG/DL (ref 0.1–1.5)
BUN SERPL-MCNC: 12 MG/DL (ref 8–22)
CALCIUM ALBUM COR SERPL-MCNC: 8.7 MG/DL (ref 8.5–10.5)
CALCIUM SERPL-MCNC: 8.9 MG/DL (ref 8.5–10.5)
CHLORIDE SERPL-SCNC: 103 MMOL/L (ref 96–112)
CO2 SERPL-SCNC: 24 MMOL/L (ref 20–33)
COMMENT 1642: NORMAL
CREAT SERPL-MCNC: 0.84 MG/DL (ref 0.5–1.4)
EKG IMPRESSION: NORMAL
EOSINOPHIL # BLD AUTO: 0.15 K/UL (ref 0–0.51)
EOSINOPHIL NFR BLD: 1.8 % (ref 0–6.9)
ERYTHROCYTE [DISTWIDTH] IN BLOOD BY AUTOMATED COUNT: 44.2 FL (ref 35.9–50)
GFR SERPLBLD CREATININE-BSD FMLA CKD-EPI: 89 ML/MIN/1.73 M 2
GLOBULIN SER CALC-MCNC: 3.5 G/DL (ref 1.9–3.5)
GLUCOSE SERPL-MCNC: 137 MG/DL (ref 65–99)
HCT VFR BLD AUTO: 26.6 % (ref 37–47)
HGB BLD-MCNC: 7.5 G/DL (ref 12–16)
HYPOCHROMIA BLD QL SMEAR: ABNORMAL
IMM GRANULOCYTES # BLD AUTO: 0.04 K/UL (ref 0–0.11)
IMM GRANULOCYTES NFR BLD AUTO: 0.5 % (ref 0–0.9)
LYMPHOCYTES # BLD AUTO: 1.28 K/UL (ref 1–4.8)
LYMPHOCYTES NFR BLD: 15.5 % (ref 22–41)
MCH RBC QN AUTO: 17.8 PG (ref 27–33)
MCHC RBC AUTO-ENTMCNC: 28.2 G/DL (ref 32.2–35.5)
MCV RBC AUTO: 63.2 FL (ref 81.4–97.8)
MICROCYTES BLD QL SMEAR: ABNORMAL
MONOCYTES # BLD AUTO: 0.42 K/UL (ref 0–0.85)
MONOCYTES NFR BLD AUTO: 5.1 % (ref 0–13.4)
MORPHOLOGY BLD-IMP: NORMAL
NEUTROPHILS # BLD AUTO: 6.34 K/UL (ref 1.82–7.42)
NEUTROPHILS NFR BLD: 76.6 % (ref 44–72)
NRBC # BLD AUTO: 0.04 K/UL
NRBC BLD-RTO: 0.5 /100 WBC (ref 0–0.2)
OVALOCYTES BLD QL SMEAR: NORMAL
PLATELET # BLD AUTO: 331 K/UL (ref 164–446)
PLATELET BLD QL SMEAR: NORMAL
PMV BLD AUTO: 9.9 FL (ref 9–12.9)
POIKILOCYTOSIS BLD QL SMEAR: NORMAL
POLYCHROMASIA BLD QL SMEAR: NORMAL
POTASSIUM SERPL-SCNC: 3.5 MMOL/L (ref 3.6–5.5)
PROT SERPL-MCNC: 7.7 G/DL (ref 6–8.2)
RBC # BLD AUTO: 4.21 M/UL (ref 4.2–5.4)
RBC BLD AUTO: PRESENT
SODIUM SERPL-SCNC: 139 MMOL/L (ref 135–145)
STOMATOCYTES BLD QL SMEAR: NORMAL
TARGETS BLD QL SMEAR: NORMAL
WBC # BLD AUTO: 8.3 K/UL (ref 4.8–10.8)

## 2023-09-13 PROCEDURE — 94640 AIRWAY INHALATION TREATMENT: CPT

## 2023-09-13 PROCEDURE — 80053 COMPREHEN METABOLIC PANEL: CPT

## 2023-09-13 PROCEDURE — 93005 ELECTROCARDIOGRAM TRACING: CPT

## 2023-09-13 PROCEDURE — 36415 COLL VENOUS BLD VENIPUNCTURE: CPT

## 2023-09-13 PROCEDURE — 700101 HCHG RX REV CODE 250: Mod: UD | Performed by: EMERGENCY MEDICINE

## 2023-09-13 PROCEDURE — 700111 HCHG RX REV CODE 636 W/ 250 OVERRIDE (IP): Performed by: EMERGENCY MEDICINE

## 2023-09-13 PROCEDURE — 71045 X-RAY EXAM CHEST 1 VIEW: CPT

## 2023-09-13 PROCEDURE — 85025 COMPLETE CBC W/AUTO DIFF WBC: CPT

## 2023-09-13 PROCEDURE — 99284 EMERGENCY DEPT VISIT MOD MDM: CPT

## 2023-09-13 PROCEDURE — 93005 ELECTROCARDIOGRAM TRACING: CPT | Performed by: EMERGENCY MEDICINE

## 2023-09-13 PROCEDURE — 94760 N-INVAS EAR/PLS OXIMETRY 1: CPT

## 2023-09-13 RX ORDER — PREDNISONE 20 MG/1
60 TABLET ORAL ONCE
Status: COMPLETED | OUTPATIENT
Start: 2023-09-13 | End: 2023-09-13

## 2023-09-13 RX ORDER — FERROUS SULFATE 325(65) MG
325 TABLET ORAL DAILY
Qty: 30 TABLET | Refills: 0 | Status: SHIPPED | OUTPATIENT
Start: 2023-09-13 | End: 2023-12-26

## 2023-09-13 RX ORDER — ALBUTEROL SULFATE 90 UG/1
2 AEROSOL, METERED RESPIRATORY (INHALATION) EVERY 6 HOURS PRN
Qty: 1 EACH | Refills: 1 | Status: SHIPPED | OUTPATIENT
Start: 2023-09-13 | End: 2023-12-26

## 2023-09-13 RX ORDER — PREDNISONE 20 MG/1
60 TABLET ORAL DAILY
Qty: 12 TABLET | Refills: 0 | Status: SHIPPED | OUTPATIENT
Start: 2023-09-14 | End: 2023-09-18

## 2023-09-13 RX ADMIN — IPRATROPIUM BROMIDE 0.5 MG: 0.5 SOLUTION RESPIRATORY (INHALATION) at 12:51

## 2023-09-13 RX ADMIN — PREDNISONE 60 MG: 20 TABLET ORAL at 13:25

## 2023-09-13 RX ADMIN — ALBUTEROL SULFATE 2.5 MG: 2.5 SOLUTION RESPIRATORY (INHALATION) at 12:50

## 2023-09-13 NOTE — ED TRIAGE NOTES
.  Chief Complaint   Patient presents with    Cough     states cough is getting worseSince 8/24 after being dx with covid,     Shortness of Breath     Sob since 8/24 with dx of covid, no hx of asthma     .Pt is alert and oriented, speaking in full sentences, follows commands and responds appropriately to questions Resp are even and unlabored.  Skin pink warm and dry     Pt placed in lobby after ekg and labs. Pt educated on triage process. Pt encouraged to alert staff for any changes.    .  Vitals:    09/13/23 1127   BP: (!) 139/94   Pulse: (!) 105   Resp: 16   Temp: 36.4 °C (97.5 °F)   SpO2: 99%

## 2023-09-13 NOTE — ED NOTES
Pt discharged to home. Pt was given follow up instructions and prescriptions for albuterol, ferrous sulfate, and prednisone. Pt verbalized understanding of all instructions for discharge and is ambulatory out of ED with steady gait. Aox4

## 2023-09-13 NOTE — DISCHARGE INSTRUCTIONS
Please follow-up with gynecologist for history of heavy menstrual cycles, and anemia.  Take daily iron supplement.    See your doctor for recheck if no improvement in breathing in 1 week, return if worse or for any concerns

## 2023-09-13 NOTE — ED PROVIDER NOTES
ED Provider Note    CHIEF COMPLAINT  Chief Complaint   Patient presents with    Cough     states cough is getting worseSince  after being dx with covid,     Shortness of Breath     Sob since  with dx of covid, no hx of asthma       EXTERNAL RECORDS REVIEWED  Inpatient Notes gynecology note from 2011, delivery after  labor    HPI/ROS  LIMITATION TO HISTORY   Select: : None  OUTSIDE HISTORIAN(S):  None    Lina Mouna Alfaro is a 43 y.o. female who presents short of breath.  Stating it feels like she cannot take a full breath.  This feeling started 2 days ago.  She has been coughing since being diagnosed with COVID .  She denies smoking.  No history of lung disease.  She has been struggling to take a deep breath with poor sleep over the last 2 nights, presenting here today for evaluation.  No chest pain, no pleurisy.  She denies history of pulmonary embolism or DVT.  No headache, no acute numbness or weakness, no syncope.    PAST MEDICAL HISTORY   has a past medical history of Allergy.    SURGICAL HISTORY  patient denies any surgical history    FAMILY HISTORY  Family History   Problem Relation Age of Onset    Diabetes Mother     Diabetes Maternal Grandmother     Alcohol/Drug Father         Alcoholic abuse    Diabetes Maternal Aunt     Hypertension Paternal Aunt     Diabetes Maternal Aunt        SOCIAL HISTORY  Social History     Tobacco Use    Smoking status: Former     Current packs/day: 0.00     Types: Cigarettes     Quit date: 6/15/2010     Years since quittin.2    Smokeless tobacco: Never    Tobacco comments:     only smoked for 2 months.2 cigs a day.   Substance and Sexual Activity    Alcohol use: Yes     Comment: occ    Drug use: No    Sexual activity: Yes     Partners: Male     Comment: none. Planned pregnancy       CURRENT MEDICATIONS  Home Medications       Reviewed by Yojana Ousna R.N. (Registered Nurse) on 23 at 1144  Med List Status: Partial  "    Medication Last Dose Status   albuterol 108 (90 Base) MCG/ACT Aero Soln inhalation aerosol  Active   azithromycin (ZITHROMAX) 250 MG Tab  Active   benzonatate (TESSALON) 100 MG Cap  Active   MethylPREDNISolone (MEDROL DOSEPAK) 4 MG Tablet Therapy Pack  Active                    ALLERGIES  Allergies   Allergen Reactions    Nkda [No Known Drug Allergy]        PHYSICAL EXAM  VITAL SIGNS: BP (!) 139/94   Pulse (!) 105   Temp 36.4 °C (97.5 °F) (Temporal)   Resp 16   Ht 1.499 m (4' 11\")   Wt 106 kg (233 lb 7.5 oz)   SpO2 99%   BMI 47.15 kg/m²    Constitutional: Well-nourished  Respiratory: Expiratory wheeze, no crackles  Psychiatric: Calm and cooperative  Cardiac: Mild tachycardia, regular rhythm  GI: Abdomen soft and nontender, no guarding  Skin: No rash    DIAGNOSTIC STUDIES / PROCEDURES  EKG  I have independently interpreted this EKG  See below    LABS  Results for orders placed or performed during the hospital encounter of 09/13/23   CBC with Differential   Result Value Ref Range    WBC 8.3 4.8 - 10.8 K/uL    RBC 4.21 4.20 - 5.40 M/uL    Hemoglobin 7.5 (L) 12.0 - 16.0 g/dL    Hematocrit 26.6 (L) 37.0 - 47.0 %    MCV 63.2 (L) 81.4 - 97.8 fL    MCH 17.8 (L) 27.0 - 33.0 pg    MCHC 28.2 (L) 32.2 - 35.5 g/dL    RDW 44.2 35.9 - 50.0 fL    Platelet Count 331 164 - 446 K/uL    MPV 9.9 9.0 - 12.9 fL    Neutrophils-Polys 76.60 (H) 44.00 - 72.00 %    Lymphocytes 15.50 (L) 22.00 - 41.00 %    Monocytes 5.10 0.00 - 13.40 %    Eosinophils 1.80 0.00 - 6.90 %    Basophils 0.50 0.00 - 1.80 %    Immature Granulocytes 0.50 0.00 - 0.90 %    Nucleated RBC 0.50 (H) 0.00 - 0.20 /100 WBC    Neutrophils (Absolute) 6.34 1.82 - 7.42 K/uL    Lymphs (Absolute) 1.28 1.00 - 4.80 K/uL    Monos (Absolute) 0.42 0.00 - 0.85 K/uL    Eos (Absolute) 0.15 0.00 - 0.51 K/uL    Baso (Absolute) 0.04 0.00 - 0.12 K/uL    Immature Granulocytes (abs) 0.04 0.00 - 0.11 K/uL    NRBC (Absolute) 0.04 K/uL    Hypochromia 2+ (A)     Anisocytosis 2+ (A)     " Microcytosis 2+ (A)    Comp Metabolic Panel   Result Value Ref Range    Sodium 139 135 - 145 mmol/L    Potassium 3.5 (L) 3.6 - 5.5 mmol/L    Chloride 103 96 - 112 mmol/L    Co2 24 20 - 33 mmol/L    Anion Gap 12.0 7.0 - 16.0    Glucose 137 (H) 65 - 99 mg/dL    Bun 12 8 - 22 mg/dL    Creatinine 0.84 0.50 - 1.40 mg/dL    Calcium 8.9 8.5 - 10.5 mg/dL    Correct Calcium 8.7 8.5 - 10.5 mg/dL    AST(SGOT) 42 12 - 45 U/L    ALT(SGPT) 52 (H) 2 - 50 U/L    Alkaline Phosphatase 77 30 - 99 U/L    Total Bilirubin 0.4 0.1 - 1.5 mg/dL    Albumin 4.2 3.2 - 4.9 g/dL    Total Protein 7.7 6.0 - 8.2 g/dL    Globulin 3.5 1.9 - 3.5 g/dL    A-G Ratio 1.2 g/dL   ESTIMATED GFR   Result Value Ref Range    GFR (CKD-EPI) 89 >60 mL/min/1.73 m 2   PLATELET ESTIMATE   Result Value Ref Range    Plt Estimation Normal    MORPHOLOGY   Result Value Ref Range    RBC Morphology Present     Polychromia 1+     Poikilocytosis 2+     Ovalocytes 1+     Target Cells 1+     Stomatocytes 1+    PERIPHERAL SMEAR REVIEW   Result Value Ref Range    Peripheral Smear Review see below    DIFFERENTIAL COMMENT   Result Value Ref Range    Comments-Diff see below    EKG   Result Value Ref Range    Report       [facility]    Test Date:  2023  Pt Name:    RADHA TIRADO          Department: ER  MRN:        [id number]                      Room:       [facility]  Gender:     Female                       Technician: [id number]  :        1980                   Requested By:ER TRIAGE PROTOCOL  Order #:    [id number]                    Reading MD: TREV CARRILLO MD    Measurements  Intervals                                Axis  Rate:       99                           P:          47  WI:         141                          QRS:        23  QRSD:       74                           T:          -1  QT:         336  QTc:        432    Interpretive Statements  Sinus rhythm  No previous ECG available for comparison  Electronically Signed On  09- 15:40:30 PDT by TREV CARRILLO MD           RADIOLOGY  I have independently interpreted the diagnostic imaging associated with this visit and am waiting the final reading from the radiologist.   My preliminary interpretation is as follows: Chest x-ray is negative for pneumonia  Radiologist interpretation:   DX-CHEST-PORTABLE (1 VIEW)   Final Result      No acute cardiopulmonary disease evident.            COURSE & MEDICAL DECISION MAKING    ED Observation Status? No; Patient does not meet criteria for ED Observation.     INITIAL ASSESSMENT, COURSE AND PLAN  Care Narrative: Patient presents with acute bronchospasm several weeks into COVID-19 diagnosis.  Chest x-ray obtained is negative for pneumonia.  Patient is negative for pulmonary embolism by PE RC rule.  Blood work shows normal white blood cell count.  Patient is more anemic than usual, baseline hemoglobin of 10 is now down to 7.5.  With normal vital signs, no active hemorrhage, she is stable for discharge.  Patient did give history of heavy menstrual cycles over the last year, I have referred her to gynecology for follow-up.  Patient placed on iron supplementation.  Regarding bronchospasm in setting of recent COVID infection, she has not developed pneumonia.  Pulse oximetry is adequate at 97%.  Subjectively felt improved following breathing treatments and I have prescribed her prednisone and albuterol as an outpatient.        ADDITIONAL PROBLEM LIST  Bronchospasm: Likely in setting of viral respiratory infection, treated with prednisone and albuterol    Anemia: History of menorrhagia, treated with iron supplementation, referred to gynecology    DISPOSITION AND DISCUSSIONS    Escalation of care considered, and ultimately not performed:acute inpatient care management, however at this time, the patient is most appropriate for outpatient management    Barriers to care at this time, including but not limited to: Patient does not have established PCP.      Decision tools and prescription drugs considered including, but not limited to: Antibiotics there is no indication for antibiotics, no evidence of pneumonia .    FINAL DIAGNOSIS  1. COVID-19    2. Anemia, unspecified type    3. History of menorrhagia    4. Acute bronchospasm           Electronically signed by: Anand Quigley M.D., 9/13/2023 12:34 PM

## 2023-12-26 ENCOUNTER — OFFICE VISIT (OUTPATIENT)
Dept: URGENT CARE | Facility: CLINIC | Age: 43
End: 2023-12-26
Payer: COMMERCIAL

## 2023-12-26 ENCOUNTER — APPOINTMENT (OUTPATIENT)
Dept: RADIOLOGY | Facility: IMAGING CENTER | Age: 43
End: 2023-12-26
Attending: NURSE PRACTITIONER
Payer: COMMERCIAL

## 2023-12-26 VITALS
HEIGHT: 59 IN | OXYGEN SATURATION: 98 % | WEIGHT: 220 LBS | RESPIRATION RATE: 20 BRPM | HEART RATE: 104 BPM | BODY MASS INDEX: 44.35 KG/M2 | DIASTOLIC BLOOD PRESSURE: 84 MMHG | TEMPERATURE: 97.5 F | SYSTOLIC BLOOD PRESSURE: 142 MMHG

## 2023-12-26 DIAGNOSIS — R05.3 CHRONIC COUGH: ICD-10-CM

## 2023-12-26 DIAGNOSIS — J18.9 PNEUMONIA OF RIGHT LOWER LOBE DUE TO INFECTIOUS ORGANISM: ICD-10-CM

## 2023-12-26 DIAGNOSIS — J10.1 INFLUENZA A: ICD-10-CM

## 2023-12-26 LAB
FLUAV RNA SPEC QL NAA+PROBE: POSITIVE
FLUBV RNA SPEC QL NAA+PROBE: NEGATIVE
RSV RNA SPEC QL NAA+PROBE: NEGATIVE
SARS-COV-2 RNA RESP QL NAA+PROBE: NEGATIVE

## 2023-12-26 PROCEDURE — 87637 SARSCOV2&INF A&B&RSV AMP PRB: CPT | Mod: QW | Performed by: NURSE PRACTITIONER

## 2023-12-26 PROCEDURE — 71046 X-RAY EXAM CHEST 2 VIEWS: CPT | Mod: TC | Performed by: NURSE PRACTITIONER

## 2023-12-26 PROCEDURE — 99204 OFFICE O/P NEW MOD 45 MIN: CPT | Performed by: NURSE PRACTITIONER

## 2023-12-26 PROCEDURE — 3079F DIAST BP 80-89 MM HG: CPT | Performed by: NURSE PRACTITIONER

## 2023-12-26 PROCEDURE — 3077F SYST BP >= 140 MM HG: CPT | Performed by: NURSE PRACTITIONER

## 2023-12-26 RX ORDER — DEXTROMETHORPHAN HYDROBROMIDE AND PROMETHAZINE HYDROCHLORIDE 15; 6.25 MG/5ML; MG/5ML
5 SYRUP ORAL EVERY 4 HOURS PRN
Qty: 120 ML | Refills: 0 | Status: SHIPPED | OUTPATIENT
Start: 2023-12-26 | End: 2024-01-16

## 2023-12-26 RX ORDER — ALBUTEROL SULFATE 90 UG/1
2 AEROSOL, METERED RESPIRATORY (INHALATION) EVERY 6 HOURS PRN
Qty: 8.5 G | Refills: 0 | Status: SHIPPED | OUTPATIENT
Start: 2023-12-26 | End: 2024-01-16

## 2023-12-26 RX ORDER — OSELTAMIVIR PHOSPHATE 75 MG/1
75 CAPSULE ORAL 2 TIMES DAILY
Qty: 10 CAPSULE | Refills: 0 | Status: SHIPPED | OUTPATIENT
Start: 2023-12-26 | End: 2024-01-16

## 2023-12-26 RX ORDER — PREDNISONE 10 MG/1
40 TABLET ORAL DAILY
Qty: 20 TABLET | Refills: 0 | Status: SHIPPED | OUTPATIENT
Start: 2023-12-26 | End: 2023-12-31

## 2023-12-26 RX ORDER — PREDNISONE 10 MG/1
40 TABLET ORAL DAILY
Qty: 20 TABLET | Refills: 0 | Status: SHIPPED | OUTPATIENT
Start: 2023-12-26 | End: 2023-12-26 | Stop reason: SDUPTHER

## 2023-12-26 RX ORDER — DOXYCYCLINE HYCLATE 100 MG
100 TABLET ORAL 2 TIMES DAILY
Qty: 14 TABLET | Refills: 0 | Status: SHIPPED | OUTPATIENT
Start: 2023-12-26 | End: 2023-12-26

## 2023-12-26 RX ORDER — DEXTROMETHORPHAN HYDROBROMIDE AND PROMETHAZINE HYDROCHLORIDE 15; 6.25 MG/5ML; MG/5ML
5 SYRUP ORAL EVERY 4 HOURS PRN
Qty: 120 ML | Refills: 0 | Status: SHIPPED | OUTPATIENT
Start: 2023-12-26 | End: 2023-12-26 | Stop reason: SDUPTHER

## 2023-12-26 RX ORDER — DOXYCYCLINE HYCLATE 100 MG
100 TABLET ORAL 2 TIMES DAILY
Qty: 14 TABLET | Refills: 0 | Status: SHIPPED | OUTPATIENT
Start: 2023-12-26 | End: 2023-12-26 | Stop reason: SDUPTHER

## 2023-12-26 RX ORDER — ALBUTEROL SULFATE 2.5 MG/3ML
2.5 SOLUTION RESPIRATORY (INHALATION) EVERY 4 HOURS PRN
Qty: 3 ML | Refills: 0 | Status: SHIPPED | OUTPATIENT
Start: 2023-12-26 | End: 2024-01-16

## 2023-12-26 ASSESSMENT — ENCOUNTER SYMPTOMS
SPUTUM PRODUCTION: 1
SHORTNESS OF BREATH: 1

## 2023-12-26 ASSESSMENT — FIBROSIS 4 INDEX: FIB4 SCORE: 0.74

## 2023-12-27 ASSESSMENT — ENCOUNTER SYMPTOMS
VOMITING: 0
MYALGIAS: 1
CHILLS: 1
DIZZINESS: 0
SORE THROAT: 0
NAUSEA: 0
FEVER: 1
COUGH: 1
EYE PAIN: 0

## 2023-12-27 NOTE — PROGRESS NOTES
"Subjective:   Lina Alfaro is a 43 y.o. female who presents for Cough (Started in August )      HPI  Patient is a 43-year-old female presents urgent care for evaluation of a cough that is been ongoing since August after she had COVID.  Patient states that she was very ill with COVID and has had a persistent cough however over the past couple days that is significantly worsened.  She did go to family over the holiday which some of the members had\" colds\" patient concerned as she has been having a persistent cough for the past 3 weeks however has changed in nature over the last couple days.  She is has body aches, with chills, fevers.  She is having shortness of breath    Review of Systems   Constitutional:  Positive for chills, fever and malaise/fatigue.   HENT:  Positive for congestion. Negative for sore throat.    Eyes:  Negative for pain.   Respiratory:  Positive for cough, sputum production and shortness of breath.    Cardiovascular:  Negative for chest pain.   Gastrointestinal:  Negative for nausea and vomiting.   Genitourinary:  Negative for hematuria.   Musculoskeletal:  Positive for myalgias.   Skin:  Negative for rash.   Neurological:  Negative for dizziness.       Medications:    This patient does not have an active medication from one of the medication groupers.    Allergies: Nkda [no known drug allergy]    Problem List: Lina Alfaro does not have any pertinent problems on file.    Surgical History:  No past surgical history on file.    Past Social Hx: Lina Alfaro  reports that she quit smoking about 13 years ago. Her smoking use included cigarettes. She has never used smokeless tobacco. She reports current alcohol use. She reports that she does not use drugs.     Past Family Hx:  Lina Alfaro family history includes Alcohol/Drug in her father; Diabetes in her maternal aunt, maternal aunt, maternal grandmother, and mother; Hypertension in her " "paternal aunt.     Problem list, medications, and allergies reviewed by myself today in Epic.     Objective:     BP (!) 142/84 (BP Location: Left arm, Patient Position: Sitting, BP Cuff Size: Adult)   Pulse (!) 104   Temp 36.4 °C (97.5 °F) (Temporal)   Resp 20   Ht 1.499 m (4' 11\")   Wt 99.8 kg (220 lb)   SpO2 98%   BMI 44.43 kg/m²     Physical Exam  Constitutional:       General: She is not in acute distress.     Appearance: She is well-developed. She is ill-appearing.   HENT:      Head: Normocephalic and atraumatic.      Right Ear: Tympanic membrane normal.      Left Ear: Tympanic membrane normal.      Nose: Nose normal.      Right Sinus: No maxillary sinus tenderness or frontal sinus tenderness.      Left Sinus: No maxillary sinus tenderness or frontal sinus tenderness.      Mouth/Throat:      Mouth: Mucous membranes are moist.      Pharynx: Oropharynx is clear. Uvula midline. No posterior oropharyngeal erythema.      Tonsils: No tonsillar exudate or tonsillar abscesses.   Eyes:      General:         Right eye: No discharge.         Left eye: No discharge.      Conjunctiva/sclera: Conjunctivae normal.   Cardiovascular:      Rate and Rhythm: Regular rhythm. Tachycardia present.      Heart sounds: Normal heart sounds, S1 normal and S2 normal.   Pulmonary:      Effort: Pulmonary effort is normal. No respiratory distress.      Breath sounds: Wheezing present.      Comments: Bronchospastic cough noted throughout exam  Musculoskeletal:      Cervical back: No rigidity.   Lymphadenopathy:      Cervical: No cervical adenopathy.   Skin:     General: Skin is warm and dry.      Capillary Refill: Capillary refill takes less than 2 seconds.   Neurological:      Mental Status: She is alert and oriented to person, place, and time.      Sensory: No sensory deficit.      Deep Tendon Reflexes: Reflexes are normal and symmetric.   Psychiatric:         Mood and Affect: Mood normal.         Behavior: Behavior normal. "         Assessment/Plan:     Diagnosis and associated orders:   1. Chronic cough  DX-CHEST-2 VIEWS    albuterol 108 (90 Base) MCG/ACT Aero Soln inhalation aerosol    albuterol (PROVENTIL) 2.5mg/3ml Nebu Soln solution for nebulization    POCT CoV-2, Flu A/B, RSV by PCR      2. Pneumonia of right lower lobe due to infectious organism  cefTRIAXone (Rocephin) 500 mg in lidocaine (Xylocaine) 1 % 2 mL for IM use    promethazine-dextromethorphan (PROMETHAZINE-DM) 6.25-15 MG/5ML syrup    predniSONE (DELTASONE) 10 MG Tab    albuterol 108 (90 Base) MCG/ACT Aero Soln inhalation aerosol    albuterol (PROVENTIL) 2.5mg/3ml Nebu Soln solution for nebulization    POCT CoV-2, Flu A/B, RSV by PCR    DISCONTINUED: predniSONE (DELTASONE) 10 MG Tab    DISCONTINUED: promethazine-dextromethorphan (PROMETHAZINE-DM) 6.25-15 MG/5ML syrup    DISCONTINUED: doxycycline (VIBRAMYCIN) 100 MG Tab    DISCONTINUED: doxycycline (VIBRAMYCIN) 100 MG Tab      3. Influenza A  oseltamivir (TAMIFLU) 75 MG Cap             Comments/MDM:     I independently reviewed the patient's imaging and agree with the interpretation of the radiologist.       Hazy left basilar opacity, concerning for infection    I personally reviewed prior external notes and prior test results pertinent to today's visit.  Patient ill-appearing, on x-ray concerns of early pneumonia viral test results were not available prior to patient leaving clinic which I did give her a dose of Rocephin.  Patient was contacted with viral test results same day as visit advised positive influenza.  It was determined onset of symptoms were within the last 2 to 3 days as she did recently become significantly more ill despite chronic ongoing cough I do suspect patient developed influenza at this time due to the patient's presenting symptoms I will treat her with Tamiflu on today's exam.  Did recommend symptom supportive care, breathing treatments as needed.    Discussed management options, risks and  benefits, and alternatives to treatment plan agreed upon.   Red flags discussed and indications to immediately call 911 or present to the Emergency Department.   Supportive care, differential diagnoses, and indications for immediate follow-up discussed with patient.    Patient expresses understanding and agrees to plan. Patient denies any other questions or concerns.                Please note that this dictation was created using voice recognition software. I have made a reasonable attempt to correct obvious errors, but I expect that there are errors of grammar and possibly content that I did not discover before finalizing the note.    This note was electronically signed by Ben DONOVAN.

## 2024-01-15 ENCOUNTER — OFFICE VISIT (OUTPATIENT)
Dept: URGENT CARE | Facility: CLINIC | Age: 44
End: 2024-01-15
Payer: MEDICAID

## 2024-01-15 VITALS
SYSTOLIC BLOOD PRESSURE: 138 MMHG | DIASTOLIC BLOOD PRESSURE: 84 MMHG | RESPIRATION RATE: 20 BRPM | TEMPERATURE: 97.8 F | BODY MASS INDEX: 44.43 KG/M2 | OXYGEN SATURATION: 97 % | HEART RATE: 105 BPM | WEIGHT: 220 LBS

## 2024-01-15 DIAGNOSIS — H10.33 ACUTE BACTERIAL CONJUNCTIVITIS OF BOTH EYES: ICD-10-CM

## 2024-01-15 DIAGNOSIS — R05.1 ACUTE COUGH: ICD-10-CM

## 2024-01-15 DIAGNOSIS — J18.9 PNEUMONIA OF RIGHT LUNG DUE TO INFECTIOUS ORGANISM, UNSPECIFIED PART OF LUNG: ICD-10-CM

## 2024-01-15 PROCEDURE — 3075F SYST BP GE 130 - 139MM HG: CPT | Performed by: NURSE PRACTITIONER

## 2024-01-15 PROCEDURE — 3079F DIAST BP 80-89 MM HG: CPT | Performed by: NURSE PRACTITIONER

## 2024-01-15 PROCEDURE — 99214 OFFICE O/P EST MOD 30 MIN: CPT | Performed by: NURSE PRACTITIONER

## 2024-01-15 RX ORDER — BENZONATATE 100 MG/1
100 CAPSULE ORAL 3 TIMES DAILY PRN
Qty: 60 CAPSULE | Refills: 0 | Status: SHIPPED | OUTPATIENT
Start: 2024-01-15

## 2024-01-15 RX ORDER — AZITHROMYCIN 250 MG/1
TABLET, FILM COATED ORAL
Qty: 6 TABLET | Refills: 0 | Status: SHIPPED | OUTPATIENT
Start: 2024-01-15

## 2024-01-15 ASSESSMENT — FIBROSIS 4 INDEX: FIB4 SCORE: 0.74

## 2024-01-15 NOTE — LETTER
January 15, 2024    To Whom It May Concern:         This is confirmation that Lina Alfaro attended her scheduled appointment with ERNESTINE Troncoso on 1/15/24.         If you have any questions please do not hesitate to call me at the phone number listed below.    Sincerely,          Chalo Newman  [phone]                  
ICU

## 2024-01-16 RX ORDER — POLYMYXIN B SULFATE AND TRIMETHOPRIM 1; 10000 MG/ML; [USP'U]/ML
1 SOLUTION OPHTHALMIC EVERY 4 HOURS
Qty: 10 ML | Refills: 0 | Status: SHIPPED | OUTPATIENT
Start: 2024-01-16 | End: 2024-01-21

## 2024-01-16 ASSESSMENT — ENCOUNTER SYMPTOMS
SORE THROAT: 0
EYE DISCHARGE: 1
DIZZINESS: 0
EYE REDNESS: 1
COUGH: 1
CHILLS: 0
FEVER: 0
SHORTNESS OF BREATH: 1
HEADACHES: 0

## 2024-01-16 NOTE — PROGRESS NOTES
Subjective:   Lina Alfaro is a 43 y.o. female who presents for Cough (Cough, B/L pink eye)      HPI  Patient is a 43-year-old female who returns clinic for reevaluation of a persistent cough.  Patient was seen by myself a week ago diagnosed with pneumonia and positive for influenza.  The day of exam we did give her a dose of Rocephin  Started her on Tamiflu as we later found her to be positive for influenza.  She did take full course of Tamiflu.  She also was treated with steroids continues to have a persistent cough,.  Also has developed a discharge from her eyes which has been using over-the-counter eyedrops.  Denies any fever or chills.  Has been doing breathing treatments.  Review of Systems   Constitutional:  Positive for malaise/fatigue. Negative for chills and fever.   HENT:  Positive for congestion. Negative for sore throat.    Eyes:  Positive for discharge and redness.   Respiratory:  Positive for cough and shortness of breath.    Neurological:  Negative for dizziness and headaches.       Medications:    azithromycin Tabs  benzonatate Caps  polymixin-trimethoprim Soln    Allergies: Nkda [no known drug allergy]    Problem List: Lina Alfaro does not have any pertinent problems on file.    Surgical History:  No past surgical history on file.    Past Social Hx: Lina Alfaro  reports that she quit smoking about 13 years ago. Her smoking use included cigarettes. She has never used smokeless tobacco. She reports current alcohol use. She reports that she does not use drugs.     Past Family Hx:  Lina Alfaro family history includes Alcohol/Drug in her father; Diabetes in her maternal aunt, maternal aunt, maternal grandmother, and mother; Hypertension in her paternal aunt.     Problem list, medications, and allergies reviewed by myself today in Epic.     Objective:     /84   Pulse (!) 105   Temp 36.6 °C (97.8 °F) (Temporal)   Resp 20   Wt 99.8 kg  (220 lb)   SpO2 97%   BMI 44.43 kg/m²     Physical Exam  Constitutional:       General: She is not in acute distress.     Appearance: She is well-developed.   HENT:      Head: Normocephalic and atraumatic.      Right Ear: Tympanic membrane normal.      Left Ear: Tympanic membrane normal.      Nose: Nose normal.      Right Sinus: No maxillary sinus tenderness or frontal sinus tenderness.      Left Sinus: No maxillary sinus tenderness or frontal sinus tenderness.      Mouth/Throat:      Mouth: Mucous membranes are moist.      Pharynx: Oropharynx is clear. Uvula midline. No posterior oropharyngeal erythema.      Tonsils: No tonsillar exudate or tonsillar abscesses.   Eyes:      General:         Right eye: No discharge.         Left eye: No discharge.      Conjunctiva/sclera: Conjunctivae normal.   Cardiovascular:      Rate and Rhythm: Normal rate and regular rhythm.   Pulmonary:      Effort: Pulmonary effort is normal. No respiratory distress.      Breath sounds: Wheezing present.   Musculoskeletal:      Cervical back: No rigidity.   Lymphadenopathy:      Cervical: No cervical adenopathy.   Skin:     General: Skin is warm and dry.      Capillary Refill: Capillary refill takes less than 2 seconds.   Neurological:      Mental Status: She is alert and oriented to person, place, and time.      Sensory: No sensory deficit.      Deep Tendon Reflexes: Reflexes are normal and symmetric.   Psychiatric:         Mood and Affect: Mood normal.         Behavior: Behavior normal.         Assessment/Plan:     Diagnosis and associated orders:     1. Pneumonia of right lung due to infectious organism, unspecified part of lung  azithromycin (ZITHROMAX) 250 MG Tab    benzonatate (TESSALON) 100 MG Cap      2. Acute cough        3. Acute bacterial conjunctivitis of both eyes  polymixin-trimethoprim (POLYTRIM) 91258-2.1 UNIT/ML-% Solution         Comments/MDM:     I personally reviewed prior external notes and prior test results pertinent  to today's visit.  Patient was seen by myself 12/26 continues to have a persistent cough initially we did treat with Tamiflu I did give her dose of Rocephin as we were aware of positive influenza at that time on today's exam I am going to start her on azithromycin and encouraged to continue with breathing treatments, cough medications.  Discussed management options, risks and benefits, and alternatives to treatment plan agreed upon.   Red flags discussed and indications to immediately call 911 or present to the Emergency Department.   Supportive care, differential diagnoses, and indications for immediate follow-up discussed with patient.    Patient expresses understanding and agrees to plan. Patient denies any other questions or concerns.              She does have a breathing machine at home recommend  Please note that this dictation was created using voice recognition software. I have made a reasonable attempt to correct obvious errors, but I expect that there are errors of grammar and possibly content that I did not discover before finalizing the note.    This note was electronically signed by Ben DONOVAN.

## 2024-08-27 ENCOUNTER — HOSPITAL ENCOUNTER (INPATIENT)
Facility: MEDICAL CENTER | Age: 44
LOS: 2 days | End: 2024-08-29
Attending: EMERGENCY MEDICINE | Admitting: STUDENT IN AN ORGANIZED HEALTH CARE EDUCATION/TRAINING PROGRAM
Payer: MEDICAID

## 2024-08-27 ENCOUNTER — APPOINTMENT (OUTPATIENT)
Dept: RADIOLOGY | Facility: MEDICAL CENTER | Age: 44
End: 2024-08-27
Attending: EMERGENCY MEDICINE
Payer: MEDICAID

## 2024-08-27 DIAGNOSIS — I51.7 CARDIOMEGALY: ICD-10-CM

## 2024-08-27 DIAGNOSIS — D64.9 ANEMIA, UNSPECIFIED TYPE: ICD-10-CM

## 2024-08-27 DIAGNOSIS — U07.1 COVID-19: ICD-10-CM

## 2024-08-27 PROBLEM — E87.70 VOLUME OVERLOAD: Status: ACTIVE | Noted: 2024-08-27

## 2024-08-27 PROBLEM — J81.0 ACUTE PULMONARY EDEMA (HCC): Status: ACTIVE | Noted: 2024-08-27

## 2024-08-27 LAB
ABO GROUP BLD: NORMAL
ALBUMIN SERPL BCP-MCNC: 3.9 G/DL (ref 3.2–4.9)
ALBUMIN/GLOB SERPL: 1.2 G/DL
ALP SERPL-CCNC: 92 U/L (ref 30–99)
ALT SERPL-CCNC: 73 U/L (ref 2–50)
ANION GAP SERPL CALC-SCNC: 12 MMOL/L (ref 7–16)
ANISOCYTOSIS BLD QL SMEAR: ABNORMAL
ANISOCYTOSIS BLD QL SMEAR: ABNORMAL
AST SERPL-CCNC: 53 U/L (ref 12–45)
BACTERIA BLD CULT: NORMAL
BARCODED ABORH UBTYP: 6200
BARCODED PRD CODE UBPRD: NORMAL
BARCODED UNIT NUM UBUNT: NORMAL
BASOPHILS # BLD AUTO: 0.6 % (ref 0–1.8)
BASOPHILS # BLD AUTO: 0.9 % (ref 0–1.8)
BASOPHILS # BLD: 0.04 K/UL (ref 0–0.12)
BASOPHILS # BLD: 0.06 K/UL (ref 0–0.12)
BILIRUB SERPL-MCNC: 0.7 MG/DL (ref 0.1–1.5)
BLD GP AB SCN SERPL QL: NORMAL
BUN SERPL-MCNC: 3 MG/DL (ref 8–22)
CALCIUM ALBUM COR SERPL-MCNC: 9.3 MG/DL (ref 8.5–10.5)
CALCIUM SERPL-MCNC: 9.2 MG/DL (ref 8.5–10.5)
CHLORIDE SERPL-SCNC: 101 MMOL/L (ref 96–112)
CO2 SERPL-SCNC: 22 MMOL/L (ref 20–33)
COMMENT 1642: NORMAL
COMMENT 1642: NORMAL
COMPONENT R 8504R: NORMAL
CREAT SERPL-MCNC: 0.52 MG/DL (ref 0.5–1.4)
DACRYOCYTES BLD QL SMEAR: NORMAL
EKG IMPRESSION: NORMAL
EOSINOPHIL # BLD AUTO: 0.06 K/UL (ref 0–0.51)
EOSINOPHIL # BLD AUTO: 0.07 K/UL (ref 0–0.51)
EOSINOPHIL NFR BLD: 0.8 % (ref 0–6.9)
EOSINOPHIL NFR BLD: 1 % (ref 0–6.9)
ERYTHROCYTE [DISTWIDTH] IN BLOOD BY AUTOMATED COUNT: 45.1 FL (ref 35.9–50)
ERYTHROCYTE [DISTWIDTH] IN BLOOD BY AUTOMATED COUNT: 45.1 FL (ref 35.9–50)
FERRITIN SERPL-MCNC: 13 NG/ML (ref 10–291)
FLUAV RNA SPEC QL NAA+PROBE: NEGATIVE
FLUBV RNA SPEC QL NAA+PROBE: NEGATIVE
GFR SERPLBLD CREATININE-BSD FMLA CKD-EPI: 118 ML/MIN/1.73 M 2
GLOBULIN SER CALC-MCNC: 3.3 G/DL (ref 1.9–3.5)
GLUCOSE SERPL-MCNC: 144 MG/DL (ref 65–99)
HCT VFR BLD AUTO: 24.2 % (ref 37–47)
HCT VFR BLD AUTO: 25.5 % (ref 37–47)
HGB BLD-MCNC: 6.7 G/DL (ref 12–16)
HGB BLD-MCNC: 6.9 G/DL (ref 12–16)
HYPOCHROMIA BLD QL SMEAR: ABNORMAL
HYPOCHROMIA BLD QL SMEAR: ABNORMAL
IMM GRANULOCYTES # BLD AUTO: 0.05 K/UL (ref 0–0.11)
IMM GRANULOCYTES # BLD AUTO: 0.06 K/UL (ref 0–0.11)
IMM GRANULOCYTES NFR BLD AUTO: 0.7 % (ref 0–0.9)
IMM GRANULOCYTES NFR BLD AUTO: 0.9 % (ref 0–0.9)
IRON SATN MFR SERPL: 4 % (ref 15–55)
IRON SERPL-MCNC: 22 UG/DL (ref 40–170)
LACTATE SERPL-SCNC: 1.1 MMOL/L (ref 0.5–2)
LYMPHOCYTES # BLD AUTO: 0.9 K/UL (ref 1–4.8)
LYMPHOCYTES # BLD AUTO: 1.13 K/UL (ref 1–4.8)
LYMPHOCYTES NFR BLD: 12.4 % (ref 22–41)
LYMPHOCYTES NFR BLD: 16.1 % (ref 22–41)
MACROCYTES BLD QL SMEAR: ABNORMAL
MCH RBC QN AUTO: 17.8 PG (ref 27–33)
MCH RBC QN AUTO: 18.1 PG (ref 27–33)
MCHC RBC AUTO-ENTMCNC: 27.1 G/DL (ref 32.2–35.5)
MCHC RBC AUTO-ENTMCNC: 27.7 G/DL (ref 32.2–35.5)
MCV RBC AUTO: 65.4 FL (ref 81.4–97.8)
MCV RBC AUTO: 65.7 FL (ref 81.4–97.8)
MICROCYTES BLD QL SMEAR: ABNORMAL
MICROCYTES BLD QL SMEAR: ABNORMAL
MONOCYTES # BLD AUTO: 0.44 K/UL (ref 0–0.85)
MONOCYTES # BLD AUTO: 0.47 K/UL (ref 0–0.85)
MONOCYTES NFR BLD AUTO: 6.1 % (ref 0–13.4)
MONOCYTES NFR BLD AUTO: 6.7 % (ref 0–13.4)
MORPHOLOGY BLD-IMP: NORMAL
MORPHOLOGY BLD-IMP: NORMAL
NEUTROPHILS # BLD AUTO: 5.22 K/UL (ref 1.82–7.42)
NEUTROPHILS # BLD AUTO: 5.77 K/UL (ref 1.82–7.42)
NEUTROPHILS NFR BLD: 74.4 % (ref 44–72)
NEUTROPHILS NFR BLD: 79.4 % (ref 44–72)
NRBC # BLD AUTO: 0.06 K/UL
NRBC # BLD AUTO: 0.07 K/UL
NRBC BLD-RTO: 0.9 /100 WBC (ref 0–0.2)
NRBC BLD-RTO: 1 /100 WBC (ref 0–0.2)
NT-PROBNP SERPL IA-MCNC: 365 PG/ML (ref 0–125)
OVALOCYTES BLD QL SMEAR: NORMAL
PLATELET # BLD AUTO: 284 K/UL (ref 164–446)
PLATELET # BLD AUTO: 299 K/UL (ref 164–446)
PLATELET BLD QL SMEAR: NORMAL
PLATELET BLD QL SMEAR: NORMAL
PMV BLD AUTO: 10 FL (ref 9–12.9)
PMV BLD AUTO: 10.3 FL (ref 9–12.9)
POIKILOCYTOSIS BLD QL SMEAR: NORMAL
POIKILOCYTOSIS BLD QL SMEAR: NORMAL
POTASSIUM SERPL-SCNC: 3.6 MMOL/L (ref 3.6–5.5)
PROCALCITONIN SERPL-MCNC: 0.07 NG/ML
PRODUCT TYPE UPROD: NORMAL
PROT SERPL-MCNC: 7.2 G/DL (ref 6–8.2)
RBC # BLD AUTO: 3.7 M/UL (ref 4.2–5.4)
RBC # BLD AUTO: 3.88 M/UL (ref 4.2–5.4)
RBC BLD AUTO: PRESENT
RBC BLD AUTO: PRESENT
RH BLD: NORMAL
RSV RNA SPEC QL NAA+PROBE: NEGATIVE
SARS-COV-2 RNA RESP QL NAA+PROBE: DETECTED
SIGNIFICANT IND 70042: NORMAL
SITE SITE: NORMAL
SODIUM SERPL-SCNC: 135 MMOL/L (ref 135–145)
SOURCE SOURCE: NORMAL
STOMATOCYTES BLD QL SMEAR: NORMAL
TIBC SERPL-MCNC: 533 UG/DL (ref 250–450)
TROPONIN T SERPL-MCNC: <6 NG/L (ref 6–19)
UIBC SERPL-MCNC: 511 UG/DL (ref 110–370)
UNIT STATUS USTAT: NORMAL
WBC # BLD AUTO: 7 K/UL (ref 4.8–10.8)
WBC # BLD AUTO: 7.3 K/UL (ref 4.8–10.8)

## 2024-08-27 PROCEDURE — 84145 PROCALCITONIN (PCT): CPT

## 2024-08-27 PROCEDURE — 86850 RBC ANTIBODY SCREEN: CPT

## 2024-08-27 PROCEDURE — 83550 IRON BINDING TEST: CPT

## 2024-08-27 PROCEDURE — P9016 RBC LEUKOCYTES REDUCED: HCPCS

## 2024-08-27 PROCEDURE — 80053 COMPREHEN METABOLIC PANEL: CPT

## 2024-08-27 PROCEDURE — 83880 ASSAY OF NATRIURETIC PEPTIDE: CPT

## 2024-08-27 PROCEDURE — 36430 TRANSFUSION BLD/BLD COMPNT: CPT

## 2024-08-27 PROCEDURE — 86923 COMPATIBILITY TEST ELECTRIC: CPT

## 2024-08-27 PROCEDURE — 0241U HCHG SARS-COV-2 COVID-19 NFCT DS RESP RNA 4 TRGT ED POC: CPT

## 2024-08-27 PROCEDURE — 700102 HCHG RX REV CODE 250 W/ 637 OVERRIDE(OP): Mod: UD | Performed by: EMERGENCY MEDICINE

## 2024-08-27 PROCEDURE — 82728 ASSAY OF FERRITIN: CPT

## 2024-08-27 PROCEDURE — 96375 TX/PRO/DX INJ NEW DRUG ADDON: CPT

## 2024-08-27 PROCEDURE — 83540 ASSAY OF IRON: CPT

## 2024-08-27 PROCEDURE — 770020 HCHG ROOM/CARE - TELE (206)

## 2024-08-27 PROCEDURE — 85025 COMPLETE CBC W/AUTO DIFF WBC: CPT

## 2024-08-27 PROCEDURE — 99223 1ST HOSP IP/OBS HIGH 75: CPT | Performed by: STUDENT IN AN ORGANIZED HEALTH CARE EDUCATION/TRAINING PROGRAM

## 2024-08-27 PROCEDURE — 71045 X-RAY EXAM CHEST 1 VIEW: CPT

## 2024-08-27 PROCEDURE — 87040 BLOOD CULTURE FOR BACTERIA: CPT

## 2024-08-27 PROCEDURE — 96372 THER/PROPH/DIAG INJ SC/IM: CPT

## 2024-08-27 PROCEDURE — 86901 BLOOD TYPING SEROLOGIC RH(D): CPT

## 2024-08-27 PROCEDURE — 99285 EMERGENCY DEPT VISIT HI MDM: CPT

## 2024-08-27 PROCEDURE — 36415 COLL VENOUS BLD VENIPUNCTURE: CPT

## 2024-08-27 PROCEDURE — 83605 ASSAY OF LACTIC ACID: CPT

## 2024-08-27 PROCEDURE — 93005 ELECTROCARDIOGRAM TRACING: CPT

## 2024-08-27 PROCEDURE — 700111 HCHG RX REV CODE 636 W/ 250 OVERRIDE (IP): Mod: JZ | Performed by: STUDENT IN AN ORGANIZED HEALTH CARE EDUCATION/TRAINING PROGRAM

## 2024-08-27 PROCEDURE — A9270 NON-COVERED ITEM OR SERVICE: HCPCS | Mod: UD | Performed by: EMERGENCY MEDICINE

## 2024-08-27 PROCEDURE — 93005 ELECTROCARDIOGRAM TRACING: CPT | Performed by: EMERGENCY MEDICINE

## 2024-08-27 PROCEDURE — 86900 BLOOD TYPING SEROLOGIC ABO: CPT

## 2024-08-27 PROCEDURE — 84484 ASSAY OF TROPONIN QUANT: CPT

## 2024-08-27 RX ORDER — ACETAMINOPHEN 325 MG/1
650 TABLET ORAL EVERY 6 HOURS PRN
Status: DISCONTINUED | OUTPATIENT
Start: 2024-08-27 | End: 2024-08-29 | Stop reason: HOSPADM

## 2024-08-27 RX ORDER — ENOXAPARIN SODIUM 100 MG/ML
40 INJECTION SUBCUTANEOUS DAILY
Status: DISCONTINUED | OUTPATIENT
Start: 2024-08-27 | End: 2024-08-28

## 2024-08-27 RX ORDER — FUROSEMIDE 10 MG/ML
40 INJECTION INTRAMUSCULAR; INTRAVENOUS EVERY 8 HOURS
Status: DISCONTINUED | OUTPATIENT
Start: 2024-08-27 | End: 2024-08-29 | Stop reason: HOSPADM

## 2024-08-27 RX ORDER — ACETAMINOPHEN 500 MG
1000 TABLET ORAL EVERY 6 HOURS PRN
COMMUNITY

## 2024-08-27 RX ORDER — M-VIT,TX,IRON,MINS/CALC/FOLIC 27MG-0.4MG
1 TABLET ORAL DAILY
COMMUNITY

## 2024-08-27 RX ORDER — CHLORAL HYDRATE 500 MG
1000 CAPSULE ORAL DAILY
COMMUNITY

## 2024-08-27 RX ORDER — ALBUTEROL SULFATE 90 UG/1
2 INHALANT RESPIRATORY (INHALATION) ONCE
Status: COMPLETED | OUTPATIENT
Start: 2024-08-27 | End: 2024-08-27

## 2024-08-27 RX ORDER — ACETAMINOPHEN 500 MG
1000 TABLET ORAL ONCE
Status: COMPLETED | OUTPATIENT
Start: 2024-08-27 | End: 2024-08-27

## 2024-08-27 RX ORDER — ALBUTEROL SULFATE 90 UG/1
2 INHALANT RESPIRATORY (INHALATION) EVERY 6 HOURS PRN
Status: ON HOLD | COMMUNITY
End: 2024-08-29

## 2024-08-27 RX ORDER — IBUPROFEN 600 MG/1
600 TABLET, FILM COATED ORAL ONCE
Status: COMPLETED | OUTPATIENT
Start: 2024-08-27 | End: 2024-08-27

## 2024-08-27 RX ADMIN — ALBUTEROL SULFATE 2 PUFF: 90 AEROSOL, METERED RESPIRATORY (INHALATION) at 14:49

## 2024-08-27 RX ADMIN — ENOXAPARIN SODIUM 40 MG: 100 INJECTION SUBCUTANEOUS at 18:05

## 2024-08-27 RX ADMIN — ACETAMINOPHEN 1000 MG: 500 TABLET ORAL at 14:49

## 2024-08-27 RX ADMIN — FUROSEMIDE 40 MG: 10 INJECTION, SOLUTION INTRAVENOUS at 22:06

## 2024-08-27 RX ADMIN — FUROSEMIDE 40 MG: 10 INJECTION, SOLUTION INTRAVENOUS at 17:10

## 2024-08-27 RX ADMIN — IBUPROFEN 600 MG: 600 TABLET, FILM COATED ORAL at 14:49

## 2024-08-27 ASSESSMENT — PAIN DESCRIPTION - PAIN TYPE: TYPE: ACUTE PAIN

## 2024-08-27 ASSESSMENT — ENCOUNTER SYMPTOMS
SHORTNESS OF BREATH: 1
SPUTUM PRODUCTION: 1
NERVOUS/ANXIOUS: 1
COUGH: 1

## 2024-08-27 ASSESSMENT — FIBROSIS 4 INDEX: FIB4 SCORE: 0.76

## 2024-08-27 NOTE — ASSESSMENT & PLAN NOTE
Likely blood loss anemia from ongoing chronic dysmenorrhea  She denies melena, hematemesis, currently no concern of GI bleed  Check iron panel  Started on PPI  Receiving unit PRBC  Monitor H&H closely, transfuse to keep hemoglobin above 7

## 2024-08-27 NOTE — ASSESSMENT & PLAN NOTE
On contact precaution  On room air  Continue monitor oxygen requirement  Patient is febrile, currently no concern for bacterial pneumonia  Check procalcitonin  If procalcitonin elevated will consider adding antibiotic

## 2024-08-27 NOTE — ED PROVIDER NOTES
ED Provider Note    Scribed for Dr. Carrasco by Jewels Barfield. 8/27/2024,  2:35 PM.      CHIEF COMPLAINT  Chief Complaint   Patient presents with    Shortness of Breath     X 2 days. Patient reports using her sons albuterol with no improvement.         EXTERNAL RECORDS REVIEWED  Outpatient Notes Patient was seen here 9/13/2023 for COVID-19 and anemia.    HPI  LIMITATION TO HISTORY   Select: : None  OUTSIDE HISTORIAN(S):  None    iLna Mouna Alfaro is a 44 y.o. female who presents to the Emergency Department for acute shortness of breath onset 2 days ago. The patient notes that yesterday, when getting home, she could not breath and could not stop coughing. She notes that she used her son's Albuterol that helped alleviate her cough. She adds that last night she kept waking up and reports having a fever, body aches, cough, and a headache. She notes that her fever began yesterday. The patient states that she took Tylenol for her fever and has been drinking tea. With her cough, she notes phlegm production, but denies coughing up any blood. The patient states that she recently had a viral illness. Denies anyone else sick at home. Denies any medical problems. The patient has no known drug allergies.     REVIEW OF SYSTEMS  See HPI for further details. All other systems are negative.     PAST MEDICAL HISTORY     Past Medical History:   Diagnosis Date    Allergy     see list.    Anemia 8/27/2024       SURGICAL HISTORY  History reviewed. No pertinent surgical history.    FAMILY HISTORY  Family History   Problem Relation Age of Onset    Diabetes Mother     Diabetes Maternal Grandmother     Alcohol/Drug Father         Alcoholic abuse    Diabetes Maternal Aunt     Hypertension Paternal Aunt     Diabetes Maternal Aunt        SOCIAL HISTORY    reports that she quit smoking about 14 years ago. Her smoking use included cigarettes. She has never used smokeless tobacco. She reports current alcohol use. She reports that she does  "not use drugs.    CURRENT MEDICATIONS  Home Medications       Reviewed by Aislinn Rosales R.N. (Registered Nurse) on 24 at 1338  Med List Status: Partial     Medication Last Dose Status   azithromycin (ZITHROMAX) 250 MG Tab  Active   benzonatate (TESSALON) 100 MG Cap  Active                  Audit from Redirected Encounters    **Home medications have not yet been reviewed for this encounter**         ALLERGIES  Allergies   Allergen Reactions    Nkda [No Known Drug Allergy]        PHYSICAL EXAM  VITAL SIGNS: BP (!) 189/105   Pulse (!) 116   Temp (!) 38.6 °C (101.5 °F) (Oral)   Resp (!) 24   Ht 1.499 m (4' 11\")   Wt 99.8 kg (220 lb 0.3 oz)   LMP 2024   SpO2 98%   BMI 44.44 kg/m²   Gen: Alert, no acute distress  HEENT: ATNC  Eyes: PERRL, EOMI, normal conjunctiva  Neck: trachea midline  Resp: lungs are clear to auscultation, no wheezes or crackles, no respiratory distress  CV: tachycardic, No JVD, regular rhythm  Abd: non-distended  Ext: No deformities  Neuro: speech fluent, moves all extremities, GCS 15    DIAGNOSTIC STUDIES / PROCEDURES  EKG  I independently interpreted this EKG.  Results for orders placed or performed during the hospital encounter of 24   EKG   Result Value Ref Range    Report       [facility] Emergency Dept.    Test Date:  2024  Pt Name:    RADHA TIRADO          Department: ER  MRN:        [id number]                      Room:  Gender:     Female                       Technician: [id number]  :        1980                   Requested By:ER TRIAGE PROTOCOL  Order #:    [id number]                    Reading MD: Dillon Carrasco    Measurements  Intervals                                Axis  Rate:       110                          P:          31  ND:         136                          QRS:        16  QRSD:       68                           T:          34  QT:         350  QTc:        474    Interpretive Statements  Sinus tachycardia  Probable left " atrial enlargement  Compared to ECG 09/13/2023 11:50:51  Sinus rhythm no longer present  Electronically Signed On 08- 14:33:51 PDT by [employer]  Labs Reviewed   CBC WITH DIFFERENTIAL - Abnormal; Notable for the following components:       Result Value    RBC 3.88 (*)     Hemoglobin 6.9 (*)     Hematocrit 25.5 (*)     MCV 65.7 (*)     MCH 17.8 (*)     MCHC 27.1 (*)     Neutrophils-Polys 79.40 (*)     Lymphocytes 12.40 (*)     Nucleated RBC 1.00 (*)     Lymphs (Absolute) 0.90 (*)     Microcytosis 2+ (*)     All other components within normal limits   COMP METABOLIC PANEL - Abnormal; Notable for the following components:    Glucose 144 (*)     Bun 3 (*)     AST(SGOT) 53 (*)     ALT(SGPT) 73 (*)     All other components within normal limits   PROBRAIN NATRIURETIC PEPTIDE, NT - Abnormal; Notable for the following components:    NT-proBNP 365 (*)     All other components within normal limits   IRON/TOTAL IRON BIND - Abnormal; Notable for the following components:    Iron 22 (*)     Total Iron Binding 533 (*)     Unsat Iron Binding 511 (*)     % Saturation 4 (*)     All other components within normal limits   CBC WITH DIFFERENTIAL - Abnormal; Notable for the following components:    RBC 3.70 (*)     Hemoglobin 6.7 (*)     Hematocrit 24.2 (*)     MCV 65.4 (*)     MCH 18.1 (*)     MCHC 27.7 (*)     Neutrophils-Polys 74.40 (*)     Lymphocytes 16.10 (*)     Nucleated RBC 0.90 (*)     Hypochromia 2+ (*)     Anisocytosis 2+ (*)     Microcytosis 2+ (*)     All other components within normal limits   POC COV-2, FLU A/B, RSV BY PCR - Abnormal; Notable for the following components:    POC SARS-CoV-2, PCR DETECTED (*)     All other components within normal limits   LACTIC ACID   BLOOD CULTURE   ESTIMATED GFR   PLATELET ESTIMATE   MORPHOLOGY   PERIPHERAL SMEAR REVIEW   DIFFERENTIAL COMMENT   COD (ADULT)   TROPONIN   FERRITIN   PROCALCITONIN   PLATELET ESTIMATE   MORPHOLOGY   PERIPHERAL SMEAR REVIEW   DIFFERENTIAL  COMMENT   POCT COV-2, FLU A/B, RSV BY PCR   RELEASE RED BLOOD CELLS   TRANSFUSE RED BLOOD CELLS-NURSING COMMUNICATION (UNITS)     RADIOLOGY  I have independently interpreted the diagnostic imaging associated with this visit.  My preliminary interpretation is as follows: Chest x-ray: Cardiomegaly, diffuse infiltrates  Radiologist interpretation:    DX-CHEST-PORTABLE (1 VIEW)   Final Result      1. Cardiomegaly, interstitial edema and trace bilateral pleural effusions, compatible with congestive heart failure and/or volume overload.   2. The remainder is stable.      EC-ECHOCARDIOGRAM COMPLETE W/O CONT    (Results Pending)       COURSE & MEDICAL DECISION MAKING  Pertinent Labs & Imaging studies were reviewed. (See chart for details)    ED Observation Status? No, the patient does not meet the criteria for ED Observation.   -----------    2:35 PM Patient seen and examined at bedside. Discussed the plan of care, including ordering labs, imaging, and EKG to further evaluate. The patient verbalizes their understanding and agreement to the plan of care.      3:53 PM - Patient was reevaluated at bedside. Discussed lab and radiology results with the patient and informed them that she is anemic and will need a blood transfusion. I informed her that her hemoglobin today was 6.7. Patient denies taking anything for her low iron levels other than over the counter iron supplements due to not having a PCP. She adds that her period will last for about two weeks. Discussed the plan for admission for further observation. Patient verbalizes understanding and agreement to this plan of care.      4:07 PM - I discussed the patient's case and the above findings with Dr. Jacome (Hospitalist) who agrees to evaluate the patient for admission.       Blood Transfusion: I have explained to the patient the risks and benefits of transfusion of blood products.  This includes, as appropriate, the risk of mild allergic reaction, hemolytic reaction,  transfusion-associated lung injury, febrile reactions, circulatory or iron overload, and infection.    We discussed possible alternatives and their risks, including directed donation, autologous transfusion, and no transfusion, including IV or oral iron supplementation, as appropriate.  I believe the patient understands the risks and benefits and was able to express understanding.     CRITICAL CARE  The very real possibility of a deterioration of this patient's condition required the highest level of my preparedness for sudden, emergent intervention.  I provided critical care services, which included medication orders, frequent reevaluations of the patient's condition and response to treatment, ordering and reviewing test results, and discussing the case with various consultants.  The critical care time associated with the care of the patient was 32 minutes. Review chart for interventions. This time is exclusive of any other billable procedures.      INITIAL ASSESSMENT AND PLAN  Medical Decision Making: Patient presents with acute viral syndrome, found to be positive for COVID-19.  Labs sent at triage demonstrate anemia, worse from prior.  Patient denies any melena, hematochezia, most likely from menorrhagia.  The patient meets criteria for blood transfusion.  Will provide her with a transfusion.  She also has what appears to cardiomegaly and either COVID pneumonia or pulmonary edema.  Troponin undetectably low, proBNP slightly elevated.  Will plan for hospitalization for further evaluation particularly in the setting of providing exogenous fluids from blood transfusion that may precipitate worsening respiratory status.  Patient is agreeable with plan    ADDITIONAL PROBLEM LIST AND DISPOSITION      I have discussed management of the patient with the following medical professionals:  Dr. Jacome (Hospitalist)     Barriers to care at this time, including but not limited to: Patient does not have established PCP.      Decision tools and prescription drugs considered including, but not limited to: Antibiotics no bacterial source identified .    DISPOSITION:  Patient will be hospitalized by Dr. Jacome in critical condition.     FINAL IMPRESSION  1. COVID-19    2. Anemia, unspecified type    3. Cardiomegaly    2.     CCT: 32 min.      Jewels HWANG (Scribe), am scribing for, and in the presence of, Dillon Carrasco M.D..    Electronically signed by: Jewels Barfield (Scribe), 8/27/2024    Dillon HWANG M.D. personally performed the services described in this documentation, as scribed by Jewels Barfield in my presence, and it is both accurate and complete.    The note accurately reflects work and decisions made by me.  Dillon Carrasco M.D.  8/27/2024  8:02 PM      This dictation was created using voice recognition software. The accuracy of the dictation is limited to the abilities of the software. I expect there may be some errors of grammar and possibly content. The nursing notes were reviewed and certain aspects of this information were incorporated into this note.

## 2024-08-27 NOTE — ED NOTES
Awaiting COD completion for blood product release.  Pt. Resting on ama, tearful about being admitted and after update from ERP on ED findings.  Son at bedside for support.  Pt. Reports her sister will be coming to get her son for her.  Pt. Denies needs at this time.  Call light in reach.

## 2024-08-27 NOTE — ED NOTES
Med rec updated and complete. Allergies reviewed.    Pt confirmed name and date of birth.    No anticoagulant  medications.      No outpatient antibiotic use in last 30 days.      Home pharmacy  [facility] = [phone]

## 2024-08-27 NOTE — ED TRIAGE NOTES
Chief Complaint   Patient presents with    Shortness of Breath     X 2 days. Patient reports using her sons albuterol with no improvement.

## 2024-08-27 NOTE — H&P
Hospital Medicine History & Physical Note    Date of Service  8/27/2024    Primary Care Physician  Pcp Pt States None        Code Status  No Order    Chief Complaint  Chief Complaint   Patient presents with    Shortness of Breath     X 2 days. Patient reports using her sons albuterol with no improvement.         History of Presenting Illness  Lina Alfaro is a 44 y.o. female with no significant past medical history who presented 8/27/2024 with worsening shortness of breath for last 3 days associated fever, productive cough, generalized weakness.  She does report of heavy menstrual cycles for almost 5 years now.  She has seen OB/GYN in the past .On arrival to ED patient is febrile Tmax 101.5, tachycardic, hypertensive.  Labs with normal white count, hemoglobin 6.9, renal function stable COVID-19 PCR positive, chest x-ray with cardiomegaly, interstitial edema . trace bilateral feet effusion.  EKG sinus tachycardia.      Patient will be admitted to the hospital for further evaluation and management for volume overload, symptomatic anemia.  Need close monitoring of blood counts, electrolytes and renal function due to potential of organ dysfunction due to ongoing volume overload, anemia.  Requiring IV PRBC transfusion for symptomatic anemia.  Requiring IV Lasix need close monitoring for toxicity.  High risk of deterioration /Arrhythmias need to be monitor closely under telemetry .       I spoke and discussed the case with the ER physician, Dr. Carrasco.  I discussed the plan of care with patient.    Review of Systems  Review of Systems   Constitutional:  Positive for malaise/fatigue.   Respiratory:  Positive for cough, sputum production and shortness of breath.    Psychiatric/Behavioral:  The patient is nervous/anxious.        Past Medical History   has a past medical history of Allergy.    Surgical History   has no past surgical history on file.     Family History  family history includes Alcohol/Drug in her  father; Diabetes in her maternal aunt, maternal aunt, maternal grandmother, and mother; Hypertension in her paternal aunt.   Family history reviewed with patient. There is no family history that is pertinent to the chief complaint.     Social History   reports that she quit smoking about 14 years ago. Her smoking use included cigarettes. She has never used smokeless tobacco. She reports current alcohol use. She reports that she does not use drugs.    Allergies  Allergies   Allergen Reactions    Nkda [No Known Drug Allergy]        Medications  None       Physical Exam  Temp:  [37.4 °C (99.4 °F)-38.6 °C (101.5 °F)] 38.6 °C (101.5 °F)  Pulse:  [102-116] 102  Resp:  [20-24] 20  BP: (152-189)/() 152/67  SpO2:  [95 %-98 %] 95 %  Blood Pressure: (!) 152/67   Temperature: (!) 38.6 °C (101.5 °F)   Pulse: (!) 102   Respiration: 20   Pulse Oximetry: 95 %       Physical Exam  Constitutional:       Appearance: She is obese. She is ill-appearing.   Cardiovascular:      Rate and Rhythm: Tachycardia present.      Pulses: Normal pulses.   Pulmonary:      Effort: Pulmonary effort is normal. No respiratory distress.      Comments: Bilateral decreased breath sound.  Auscultation limited due to obesity  Abdominal:      General: Abdomen is flat. There is no distension.   Musculoskeletal:      Right lower leg: No edema.      Left lower leg: No edema.   Skin:     General: Skin is warm.   Neurological:      General: No focal deficit present.      Mental Status: She is alert and oriented to person, place, and time.   Psychiatric:      Comments: Patient is anxious on exam, tearful         Laboratory:  Recent Labs     08/27/24  1406   WBC 7.3   RBC 3.88*   HEMOGLOBIN 6.9*   HEMATOCRIT 25.5*   MCV 65.7*   MCH 17.8*   MCHC 27.1*   RDW 45.1   PLATELETCT 299   MPV 10.0     Recent Labs     08/27/24  1406   SODIUM 135   POTASSIUM 3.6   CHLORIDE 101   CO2 22   GLUCOSE 144*   BUN 3*   CREATININE 0.52   CALCIUM 9.2     Recent Labs      "08/27/24  1406   ALTSGPT 73*   ASTSGOT 53*   ALKPHOSPHAT 92   TBILIRUBIN 0.7   GLUCOSE 144*         No results for input(s): \"NTPROBNP\" in the last 72 hours.      No results for input(s): \"TROPONINT\" in the last 72 hours.    Imaging:  DX-CHEST-PORTABLE (1 VIEW)   Final Result      1. Cardiomegaly, interstitial edema and trace bilateral pleural effusions, compatible with congestive heart failure and/or volume overload.   2. The remainder is stable.          X-Ray:  I have personally reviewed the images and compared with prior images.  EKG:  I have personally reviewed the images and compared with prior images.    Assessment/Plan:  Justification for Admission Status  I anticipate this patient will require at least two midnights for appropriate medical management, necessitating inpatient admission    for further evaluation and management for volume overload, symptomatic anemia.Need close monitoring of blood counts, electrolytes and renal function due to potential of organ dysfunction due to ongoing volume overload, anemia.  Requiring IV PRBC transfusion for symptomatic anemia.  Requiring IV Lasix/IV antibiotics, need close monitoring for toxicity.  High risk of deterioration /Arrhythmias need to be monitor closely under telemetry .         * Volume overload  Assessment & Plan  Patient with worsening shortness of breath  No prior cardiac history  Chest x-ray noted with cardiomegaly, interstitial edema    Started on Lasix 40mg TID, monitor potassium and magnesium level   Supplement O2 as needed  Fluid restriction   Daily strict input and output  Daily weight  Serial troponin/CK-MB/EKG  Monitor Intake/Output, Daily Weights  ECHO rule out LVD      COVID-19  Assessment & Plan  On contact precaution  On room air  Continue monitor oxygen requirement  Patient is febrile, currently no concern for bacterial pneumonia  Check procalcitonin  If procalcitonin elevated will consider adding antibiotic    Symptomatic anemia  Assessment " & Plan  Likely blood loss anemia from ongoing chronic dysmenorrhea  She denies melena, hematemesis, currently no concern of GI bleed  Check iron panel  Started on PPI  Receiving unit PRBC  Monitor H&H closely, transfuse to keep hemoglobin above 7    Acute pulmonary edema (HCC)- (present on admission)  Assessment & Plan  On IV Lasix  echocardiogram is ordered for evaluation        VTE prophylaxis: SCDs/TEDs and enoxaparin ppx    I independently reviewed pertinent clinical lab tests since admission and ordered additional follow up clinical lab tests.  Admission order was placed.  Labs ordered for follow-up.  I independently reviewed pertinent radiographic images and the radiologist's reports since admission and ordered additional follow up radiographic studies.  The details of the available patient records in [facility] (including laboratory tests, culture data, medications, imaging, and other pertinent diagnostic tests) and that information was utilized as warranted in today's medical decision making for this patient.  I personally evaluated the patient condition at bedside.     Care interventions include:   Review of interval medical and surgical history, current medications and outpatient medication reconciliation, interval imaging studies and radiologist interpretation, and interval laboratory values.

## 2024-08-27 NOTE — ASSESSMENT & PLAN NOTE
Patient with worsening shortness of breath  No prior cardiac history  Chest x-ray noted with cardiomegaly, interstitial edema    Telemetry monitoring  Started on Lasix 40mg TID, monitor potassium and magnesium level   Supplement O2 as needed  Fluid restriction   Daily strict input and output  Daily weight  Serial troponin/CK-MB/EKG  Monitor Intake/Output, Daily Weights  ECHO rule out LVD  Requiring IV Lasix need close monitoring for toxicity.  High risk of deterioration /Arrhythmias need to be monitor closely under telemetry .

## 2024-08-28 ENCOUNTER — APPOINTMENT (OUTPATIENT)
Dept: RADIOLOGY | Facility: MEDICAL CENTER | Age: 44
End: 2024-08-28
Payer: MEDICAID

## 2024-08-28 LAB
ANION GAP SERPL CALC-SCNC: 16 MMOL/L (ref 7–16)
ANISOCYTOSIS BLD QL SMEAR: ABNORMAL
BASOPHILS # BLD AUTO: 0.6 % (ref 0–1.8)
BASOPHILS # BLD AUTO: 0.6 % (ref 0–1.8)
BASOPHILS # BLD AUTO: 0.9 % (ref 0–1.8)
BASOPHILS # BLD: 0.04 K/UL (ref 0–0.12)
BASOPHILS # BLD: 0.05 K/UL (ref 0–0.12)
BASOPHILS # BLD: 0.06 K/UL (ref 0–0.12)
BUN SERPL-MCNC: 6 MG/DL (ref 8–22)
CALCIUM SERPL-MCNC: 9.4 MG/DL (ref 8.5–10.5)
CHLORIDE SERPL-SCNC: 100 MMOL/L (ref 96–112)
CO2 SERPL-SCNC: 23 MMOL/L (ref 20–33)
COMMENT 1642: NORMAL
CREAT SERPL-MCNC: 0.56 MG/DL (ref 0.5–1.4)
CRP SERPL HS-MCNC: 5.01 MG/DL (ref 0–0.75)
D DIMER PPP IA.FEU-MCNC: 0.74 UG/ML (FEU) (ref 0–0.5)
EOSINOPHIL # BLD AUTO: 0.12 K/UL (ref 0–0.51)
EOSINOPHIL # BLD AUTO: 0.12 K/UL (ref 0–0.51)
EOSINOPHIL # BLD AUTO: 0.18 K/UL (ref 0–0.51)
EOSINOPHIL NFR BLD: 1.8 % (ref 0–6.9)
EOSINOPHIL NFR BLD: 1.9 % (ref 0–6.9)
EOSINOPHIL NFR BLD: 2.1 % (ref 0–6.9)
ERYTHROCYTE [DISTWIDTH] IN BLOOD BY AUTOMATED COUNT: 52.8 FL (ref 35.9–50)
ERYTHROCYTE [DISTWIDTH] IN BLOOD BY AUTOMATED COUNT: 54.1 FL (ref 35.9–50)
ERYTHROCYTE [DISTWIDTH] IN BLOOD BY AUTOMATED COUNT: 55 FL (ref 35.9–50)
GFR SERPLBLD CREATININE-BSD FMLA CKD-EPI: 116 ML/MIN/1.73 M 2
GLUCOSE SERPL-MCNC: 164 MG/DL (ref 65–99)
HCT VFR BLD AUTO: 30.6 % (ref 37–47)
HCT VFR BLD AUTO: 30.8 % (ref 37–47)
HCT VFR BLD AUTO: 31.2 % (ref 37–47)
HGB BLD-MCNC: 8.8 G/DL (ref 12–16)
HGB BLD-MCNC: 9.1 G/DL (ref 12–16)
HGB BLD-MCNC: 9.1 G/DL (ref 12–16)
HYPOCHROMIA BLD QL SMEAR: ABNORMAL
IMM GRANULOCYTES # BLD AUTO: 0.04 K/UL (ref 0–0.11)
IMM GRANULOCYTES # BLD AUTO: 0.05 K/UL (ref 0–0.11)
IMM GRANULOCYTES # BLD AUTO: 0.07 K/UL (ref 0–0.11)
IMM GRANULOCYTES NFR BLD AUTO: 0.6 % (ref 0–0.9)
IMM GRANULOCYTES NFR BLD AUTO: 0.6 % (ref 0–0.9)
IMM GRANULOCYTES NFR BLD AUTO: 1.1 % (ref 0–0.9)
LYMPHOCYTES # BLD AUTO: 1.12 K/UL (ref 1–4.8)
LYMPHOCYTES # BLD AUTO: 1.2 K/UL (ref 1–4.8)
LYMPHOCYTES # BLD AUTO: 1.45 K/UL (ref 1–4.8)
LYMPHOCYTES NFR BLD: 16.7 % (ref 22–41)
LYMPHOCYTES NFR BLD: 16.8 % (ref 22–41)
LYMPHOCYTES NFR BLD: 19.2 % (ref 22–41)
MCH RBC QN AUTO: 19.7 PG (ref 27–33)
MCH RBC QN AUTO: 19.9 PG (ref 27–33)
MCH RBC QN AUTO: 20.1 PG (ref 27–33)
MCHC RBC AUTO-ENTMCNC: 28.8 G/DL (ref 32.2–35.5)
MCHC RBC AUTO-ENTMCNC: 29.2 G/DL (ref 32.2–35.5)
MCHC RBC AUTO-ENTMCNC: 29.5 G/DL (ref 32.2–35.5)
MCV RBC AUTO: 67.7 FL (ref 81.4–97.8)
MCV RBC AUTO: 68.1 FL (ref 81.4–97.8)
MCV RBC AUTO: 69.1 FL (ref 81.4–97.8)
MICROCYTES BLD QL SMEAR: ABNORMAL
MONOCYTES # BLD AUTO: 0.45 K/UL (ref 0–0.85)
MONOCYTES # BLD AUTO: 0.47 K/UL (ref 0–0.85)
MONOCYTES # BLD AUTO: 0.56 K/UL (ref 0–0.85)
MONOCYTES NFR BLD AUTO: 6.5 % (ref 0–13.4)
MONOCYTES NFR BLD AUTO: 6.7 % (ref 0–13.4)
MONOCYTES NFR BLD AUTO: 7.5 % (ref 0–13.4)
MORPHOLOGY BLD-IMP: NORMAL
NEUTROPHILS # BLD AUTO: 4.34 K/UL (ref 1.82–7.42)
NEUTROPHILS # BLD AUTO: 4.93 K/UL (ref 1.82–7.42)
NEUTROPHILS # BLD AUTO: 6.36 K/UL (ref 1.82–7.42)
NEUTROPHILS NFR BLD: 69.7 % (ref 44–72)
NEUTROPHILS NFR BLD: 73.3 % (ref 44–72)
NEUTROPHILS NFR BLD: 73.4 % (ref 44–72)
NRBC # BLD AUTO: 0.06 K/UL
NRBC # BLD AUTO: 0.08 K/UL
NRBC # BLD AUTO: 0.09 K/UL
NRBC BLD-RTO: 0.9 /100 WBC (ref 0–0.2)
NRBC BLD-RTO: 0.9 /100 WBC (ref 0–0.2)
NRBC BLD-RTO: 1.4 /100 WBC (ref 0–0.2)
PLATELET # BLD AUTO: 301 K/UL (ref 164–446)
PLATELET # BLD AUTO: 317 K/UL (ref 164–446)
PLATELET # BLD AUTO: 362 K/UL (ref 164–446)
PLATELET BLD QL SMEAR: NORMAL
PMV BLD AUTO: 10.1 FL (ref 9–12.9)
PMV BLD AUTO: 10.2 FL (ref 9–12.9)
PMV BLD AUTO: 10.3 FL (ref 9–12.9)
POLYCHROMASIA BLD QL SMEAR: NORMAL
POTASSIUM SERPL-SCNC: 3.3 MMOL/L (ref 3.6–5.5)
PROCALCITONIN SERPL-MCNC: 0.11 NG/ML
RBC # BLD AUTO: 4.43 M/UL (ref 4.2–5.4)
RBC # BLD AUTO: 4.52 M/UL (ref 4.2–5.4)
RBC # BLD AUTO: 4.61 M/UL (ref 4.2–5.4)
RBC BLD AUTO: PRESENT
SODIUM SERPL-SCNC: 139 MMOL/L (ref 135–145)
WBC # BLD AUTO: 6.2 K/UL (ref 4.8–10.8)
WBC # BLD AUTO: 6.7 K/UL (ref 4.8–10.8)
WBC # BLD AUTO: 8.7 K/UL (ref 4.8–10.8)

## 2024-08-28 PROCEDURE — 770020 HCHG ROOM/CARE - TELE (206)

## 2024-08-28 PROCEDURE — 36415 COLL VENOUS BLD VENIPUNCTURE: CPT

## 2024-08-28 PROCEDURE — 85025 COMPLETE CBC W/AUTO DIFF WBC: CPT

## 2024-08-28 PROCEDURE — 700102 HCHG RX REV CODE 250 W/ 637 OVERRIDE(OP)

## 2024-08-28 PROCEDURE — 93970 EXTREMITY STUDY: CPT

## 2024-08-28 PROCEDURE — 700111 HCHG RX REV CODE 636 W/ 250 OVERRIDE (IP): Mod: JZ | Performed by: STUDENT IN AN ORGANIZED HEALTH CARE EDUCATION/TRAINING PROGRAM

## 2024-08-28 PROCEDURE — 700105 HCHG RX REV CODE 258: Performed by: STUDENT IN AN ORGANIZED HEALTH CARE EDUCATION/TRAINING PROGRAM

## 2024-08-28 PROCEDURE — 80048 BASIC METABOLIC PNL TOTAL CA: CPT

## 2024-08-28 PROCEDURE — 700102 HCHG RX REV CODE 250 W/ 637 OVERRIDE(OP): Performed by: STUDENT IN AN ORGANIZED HEALTH CARE EDUCATION/TRAINING PROGRAM

## 2024-08-28 PROCEDURE — A9270 NON-COVERED ITEM OR SERVICE: HCPCS | Performed by: STUDENT IN AN ORGANIZED HEALTH CARE EDUCATION/TRAINING PROGRAM

## 2024-08-28 PROCEDURE — 700111 HCHG RX REV CODE 636 W/ 250 OVERRIDE (IP): Mod: JZ

## 2024-08-28 PROCEDURE — 94640 AIRWAY INHALATION TREATMENT: CPT

## 2024-08-28 PROCEDURE — 85379 FIBRIN DEGRADATION QUANT: CPT

## 2024-08-28 PROCEDURE — 86140 C-REACTIVE PROTEIN: CPT

## 2024-08-28 PROCEDURE — 84145 PROCALCITONIN (PCT): CPT

## 2024-08-28 PROCEDURE — 99232 SBSQ HOSP IP/OBS MODERATE 35: CPT | Performed by: INTERNAL MEDICINE

## 2024-08-28 PROCEDURE — A9270 NON-COVERED ITEM OR SERVICE: HCPCS

## 2024-08-28 RX ORDER — POTASSIUM CHLORIDE 1500 MG/1
40 TABLET, EXTENDED RELEASE ORAL ONCE
Status: COMPLETED | OUTPATIENT
Start: 2024-08-28 | End: 2024-08-28

## 2024-08-28 RX ORDER — ALBUTEROL SULFATE 90 UG/1
2 INHALANT RESPIRATORY (INHALATION)
Status: DISCONTINUED | OUTPATIENT
Start: 2024-08-28 | End: 2024-08-28

## 2024-08-28 RX ORDER — ALBUTEROL SULFATE 90 UG/1
2 INHALANT RESPIRATORY (INHALATION) EVERY 4 HOURS
Status: DISCONTINUED | OUTPATIENT
Start: 2024-08-28 | End: 2024-08-29 | Stop reason: HOSPADM

## 2024-08-28 RX ORDER — ENOXAPARIN SODIUM 100 MG/ML
40 INJECTION SUBCUTANEOUS
Status: DISCONTINUED | OUTPATIENT
Start: 2024-08-28 | End: 2024-08-29 | Stop reason: HOSPADM

## 2024-08-28 RX ORDER — ALBUTEROL SULFATE 90 UG/1
2 INHALANT RESPIRATORY (INHALATION)
Status: DISCONTINUED | OUTPATIENT
Start: 2024-08-28 | End: 2024-08-29 | Stop reason: HOSPADM

## 2024-08-28 RX ADMIN — SODIUM CHLORIDE 250 MG: 9 INJECTION, SOLUTION INTRAVENOUS at 18:17

## 2024-08-28 RX ADMIN — POTASSIUM CHLORIDE 40 MEQ: 1500 TABLET, EXTENDED RELEASE ORAL at 09:12

## 2024-08-28 RX ADMIN — FUROSEMIDE 40 MG: 10 INJECTION, SOLUTION INTRAVENOUS at 14:13

## 2024-08-28 RX ADMIN — ALBUTEROL SULFATE 2 PUFF: 90 INHALANT RESPIRATORY (INHALATION) at 19:27

## 2024-08-28 RX ADMIN — FUROSEMIDE 40 MG: 10 INJECTION, SOLUTION INTRAVENOUS at 04:42

## 2024-08-28 RX ADMIN — ALBUTEROL SULFATE 2 PUFF: 90 AEROSOL, METERED RESPIRATORY (INHALATION) at 23:03

## 2024-08-28 RX ADMIN — FUROSEMIDE 40 MG: 10 INJECTION, SOLUTION INTRAVENOUS at 23:02

## 2024-08-28 RX ADMIN — OMEPRAZOLE 20 MG: 20 CAPSULE, DELAYED RELEASE ORAL at 05:20

## 2024-08-28 RX ADMIN — POTASSIUM CHLORIDE 40 MEQ: 1500 TABLET, EXTENDED RELEASE ORAL at 18:10

## 2024-08-28 RX ADMIN — ENOXAPARIN SODIUM 40 MG: 100 INJECTION SUBCUTANEOUS at 23:02

## 2024-08-28 SDOH — ECONOMIC STABILITY: TRANSPORTATION INSECURITY
IN THE PAST 12 MONTHS, HAS LACK OF RELIABLE TRANSPORTATION KEPT YOU FROM MEDICAL APPOINTMENTS, MEETINGS, WORK OR FROM GETTING THINGS NEEDED FOR DAILY LIVING?: NO

## 2024-08-28 SDOH — ECONOMIC STABILITY: TRANSPORTATION INSECURITY
IN THE PAST 12 MONTHS, HAS THE LACK OF TRANSPORTATION KEPT YOU FROM MEDICAL APPOINTMENTS OR FROM GETTING MEDICATIONS?: NO

## 2024-08-28 ASSESSMENT — ENCOUNTER SYMPTOMS
SHORTNESS OF BREATH: 1
FLANK PAIN: 0
DIZZINESS: 0
PALPITATIONS: 0
COUGH: 1
SPUTUM PRODUCTION: 1
FEVER: 1
WHEEZING: 1
CHILLS: 1

## 2024-08-28 ASSESSMENT — LIFESTYLE VARIABLES
ALCOHOL_USE: NO
HAVE YOU EVER FELT YOU SHOULD CUT DOWN ON YOUR DRINKING: NO
HAVE YOU EVER FELT YOU SHOULD CUT DOWN ON YOUR DRINKING: NO
EVER HAD A DRINK FIRST THING IN THE MORNING TO STEADY YOUR NERVES TO GET RID OF A HANGOVER: NO
EVER FELT BAD OR GUILTY ABOUT YOUR DRINKING: NO
EVER HAD A DRINK FIRST THING IN THE MORNING TO STEADY YOUR NERVES TO GET RID OF A HANGOVER: NO
ALCOHOL_USE: NO
TOTAL SCORE: 0
HAVE PEOPLE ANNOYED YOU BY CRITICIZING YOUR DRINKING: NO
CONSUMPTION TOTAL: NEGATIVE
TOTAL SCORE: 0
TOTAL SCORE: 0
DOES PATIENT WANT TO STOP DRINKING: NO
HAVE PEOPLE ANNOYED YOU BY CRITICIZING YOUR DRINKING: NO
ALCOHOL_USE: NO
EVER FELT BAD OR GUILTY ABOUT YOUR DRINKING: NO
TOTAL SCORE: 0
CONSUMPTION TOTAL: INCOMPLETE
EVER FELT BAD OR GUILTY ABOUT YOUR DRINKING: NO
EVER HAD A DRINK FIRST THING IN THE MORNING TO STEADY YOUR NERVES TO GET RID OF A HANGOVER: NO
TOTAL SCORE: 0
HAVE PEOPLE ANNOYED YOU BY CRITICIZING YOUR DRINKING: NO
HOW MANY TIMES IN THE PAST YEAR HAVE YOU HAD 5 OR MORE DRINKS IN A DAY: 0
CONSUMPTION TOTAL: INCOMPLETE
HAVE YOU EVER FELT YOU SHOULD CUT DOWN ON YOUR DRINKING: NO
ON A TYPICAL DAY WHEN YOU DRINK ALCOHOL HOW MANY DRINKS DO YOU HAVE: 0
TOTAL SCORE: 0
DOES PATIENT WANT TO STOP DRINKING: NO
AVERAGE NUMBER OF DAYS PER WEEK YOU HAVE A DRINK CONTAINING ALCOHOL: 0
TOTAL SCORE: 0

## 2024-08-28 ASSESSMENT — COGNITIVE AND FUNCTIONAL STATUS - GENERAL
MOBILITY SCORE: 24
SUGGESTED CMS G CODE MODIFIER MOBILITY: CH
DAILY ACTIVITIY SCORE: 24
SUGGESTED CMS G CODE MODIFIER DAILY ACTIVITY: CH

## 2024-08-28 ASSESSMENT — PAIN DESCRIPTION - PAIN TYPE
TYPE: ACUTE PAIN

## 2024-08-28 ASSESSMENT — PATIENT HEALTH QUESTIONNAIRE - PHQ9
SUM OF ALL RESPONSES TO PHQ9 QUESTIONS 1 AND 2: 0
1. LITTLE INTEREST OR PLEASURE IN DOING THINGS: NOT AT ALL
2. FEELING DOWN, DEPRESSED, IRRITABLE, OR HOPELESS: NOT AT ALL

## 2024-08-28 ASSESSMENT — SOCIAL DETERMINANTS OF HEALTH (SDOH)
WITHIN THE LAST YEAR, HAVE TO BEEN RAPED OR FORCED TO HAVE ANY KIND OF SEXUAL ACTIVITY BY YOUR PARTNER OR EX-PARTNER?: NO
WITHIN THE LAST YEAR, HAVE YOU BEEN AFRAID OF YOUR PARTNER OR EX-PARTNER?: NO
WITHIN THE PAST 12 MONTHS, YOU WORRIED THAT YOUR FOOD WOULD RUN OUT BEFORE YOU GOT THE MONEY TO BUY MORE: PATIENT DECLINED
WITHIN THE LAST YEAR, HAVE YOU BEEN KICKED, HIT, SLAPPED, OR OTHERWISE PHYSICALLY HURT BY YOUR PARTNER OR EX-PARTNER?: NO
IN THE PAST 12 MONTHS, HAS THE ELECTRIC, GAS, OIL, OR WATER COMPANY THREATENED TO SHUT OFF SERVICE IN YOUR HOME?: PATIENT DECLINED
WITHIN THE PAST 12 MONTHS, THE FOOD YOU BOUGHT JUST DIDN'T LAST AND YOU DIDN'T HAVE MONEY TO GET MORE: PATIENT DECLINED
WITHIN THE LAST YEAR, HAVE YOU BEEN HUMILIATED OR EMOTIONALLY ABUSED IN OTHER WAYS BY YOUR PARTNER OR EX-PARTNER?: NO

## 2024-08-28 NOTE — DIETARY
"NUTRITION SERVICES: BMI - Pt with BMI >40 (=Body mass index is 44.44 kg/m².), Class III obesity. Weight is taken via \"other healthcare provider\" and pt is -0.3L fluids per I/O. Weight is same as 220 lb at office visit on 1/15/24, consider new measured weight scale. Weight loss counseling not appropriate in acute care setting. RECOMMEND - If appropriate at DC please refer to outpatient nutrition services for weight management.     "

## 2024-08-28 NOTE — CARE PLAN
The patient is Stable - Low risk of patient condition declining or worsening    Shift Goals  Clinical Goals: Diuresis  Patient Goals: Rest  Family Goals: Updates    Progress made toward(s) clinical / shift goals:    Problem: Care Map:  Admission Optimal Outcome for the Heart Failure Patient  Goal: Admission:  Optimal Care of the heart failure patient  Outcome: Progressing     Problem: Knowledge Deficit - Standard  Goal: Patient and family/care givers will demonstrate understanding of plan of care, disease process/condition, diagnostic tests and medications  Outcome: Progressing     Problem: Communication  Goal: The ability to communicate needs accurately and effectively will improve  Outcome: Progressing     Problem: Psychosocial Needs:  Goal: Level of anxiety will decrease  Outcome: Progressing     Problem: Fluid Volume:  Goal: Will maintain balanced intake and output  Outcome: Progressing       Patient is not progressing towards the following goals:

## 2024-08-28 NOTE — CARE PLAN
Problem: Communication  Goal: The ability to communicate needs accurately and effectively will improve  Outcome: Progressing   Will continue to educate patient abut the POC. Patient verbalize understanding.     Problem: Psychosocial Needs:  Goal: Level of anxiety will decrease  Outcome: Progressing   By the end of shift,  patient will list two factors that will assist to decrease anxiety.     Problem: Fluid Volume:  Goal: Will maintain balanced intake and output  Outcome: Progressing   Will continue with daily weight and monitor the patient's I&O's.     The patient is Stable - Low risk of patient condition declining or worsening    Shift Goals  Clinical Goals: Monitor labs, I&O's, daily weight  Patient Goals: POC and POC  Family Goals: N/A    Progress made toward(s) clinical / shift goals:    Patient is not progressing towards the following goals:

## 2024-08-28 NOTE — PROGRESS NOTES
[facility] Internal Medicine Daily Progress Note    Date of Service  8/28/2024    [employer] Team: R IM Blue Team   Attending: Chelsi Hancock M.d.  Senior Resident: Dr. Smith   Intern:  Dr. Cruz     Chief Complaint  Lina Alfaro is a 44 y.o. female admitted 8/27/2024 with fluid overload.     Hospital Course  Lina Alfaro is a 44 y.o. female with no significant past medical history who presented 8/27/2024 with worsening shortness of breath for last 3 days associated fever, productive cough, generalized weakness.  She does report of heavy menstrual cycles for almost 5 years now.  She has seen OB/GYN in the past .On arrival to ED patient is febrile Tmax 101.5, tachycardic, hypertensive.  Labs with normal white count, hemoglobin 6.9, renal function stable COVID-19 PCR positive, chest x-ray with cardiomegaly, interstitial edema . trace bilateral feet effusion.  EKG sinus tachycardia. Patient  admitted to the hospital for further evaluation and management for volume overload, symptomatic anemia.Need close monitoring of blood counts, electrolytes and renal function due to potential of organ dysfunction due to ongoing volume overload, anemia.  Requiring IV PRBC transfusion for symptomatic anemia.Requiring IV Lasix need close monitoring for toxicity. High risk of deterioration /Arrhythmias need to be monitor closely under telemetry.     Interval Problem Update    8/28  Patient today states she has dyspnea, fevers, chills, all since this past Sunday. Patient also has a cough with yellow phlegm, and body aches. Patient denies chest pain, dizziness/lightheadedness, urinary changes.     Consultants/Specialty    Code Status  Full Code    Disposition  The patient is not medically cleared for discharge to home or a post-acute facility.      I have placed the appropriate orders for post-discharge needs.    Review of Systems  Review of Systems   Constitutional:  Positive for chills, fever and malaise/fatigue.    Respiratory:  Positive for cough, sputum production, shortness of breath and wheezing.    Cardiovascular:  Negative for chest pain and palpitations.   Genitourinary:  Negative for dysuria, flank pain, frequency, hematuria and urgency.   Neurological:  Negative for dizziness.        Physical Exam  Temp:  [36 °C (96.8 °F)-37 °C (98.6 °F)] 36 °C (96.8 °F)  Pulse:  [] 103  Resp:  [16-20] 18  BP: (127-152)/(64-91) 140/91  SpO2:  [88 %-97 %] 95 %    Physical Exam  HENT:      Head: Normocephalic and atraumatic.      Nose: Congestion and rhinorrhea present.   Pulmonary:      Effort: Respiratory distress present.      Breath sounds: Wheezing present.   Skin:     Coloration: Skin is not jaundiced.      Findings: No bruising or lesion.   Neurological:      General: No focal deficit present.         Fluids    Intake/Output Summary (Last 24 hours) at 8/28/2024 1510  Last data filed at 8/28/2024 1045  Gross per 24 hour   Intake 1169.17 ml   Output 800 ml   Net 369.17 ml       Laboratory  Recent Labs     08/27/24  1705 08/28/24  0049 08/28/24  0940   WBC 7.0 6.2 6.7   RBC 3.70* 4.43 4.52   HEMOGLOBIN 6.7* 8.8* 9.1*   HEMATOCRIT 24.2* 30.6* 30.8*   MCV 65.4* 69.1* 68.1*   MCH 18.1* 19.9* 20.1*   MCHC 27.7* 28.8* 29.5*   RDW 45.1 55.0* 54.1*   PLATELETCT 284 301 317   MPV 10.3 10.3 10.1     Recent Labs     08/27/24  1406 08/28/24  0049   SODIUM 135 139   POTASSIUM 3.6 3.3*   CHLORIDE 101 100   CO2 22 23   GLUCOSE 144* 164*   BUN 3* 6*   CREATININE 0.52 0.56   CALCIUM 9.2 9.4     Imaging  DX-CHEST-PORTABLE (1 VIEW)   Final Result      1. Cardiomegaly, interstitial edema and trace bilateral pleural effusions, compatible with congestive heart failure and/or volume overload.   2. The remainder is stable.      EC-ECHOCARDIOGRAM COMPLETE W/O CONT    (Results Pending)   US-EXTREMITY VENOUS LOWER BILAT    (Results Pending)        Assessment/Plan      * Volume overload  Dyspnea  Assessment & Plan  Patient with worsening shortness of  breath  No prior cardiac history  Chest x-ray noted with cardiomegaly, interstitial edema  Dimer, CRP ordered   US of lower extremity bilaterally to r/o DVT    Started on Lasix 40mg TID, monitor potassium and magnesium level   Supplement O2 as needed  Fluid restriction   Daily strict input and output  Daily weight  Serial troponin/CK-MB/EKG  Monitor Intake/Output, Daily Weights  ECHO rule out LVD    Low Iron  -IV iron     Hypokalemia   -Oral potassium supplement      COVID-19  Assessment & Plan  On contact precaution  On room air  Continue monitor oxygen requirement  Patient is febrile, currently no concern for bacterial pneumonia  Procalcitonin .11      Symptomatic anemia  Assessment & Plan  Likely blood loss anemia from ongoing chronic dysmenorrhea  She denies melena, hematemesis, currently no concern of GI bleed  Low Iron   Receiving unit PRBC  Monitor H&H closely, transfuse to keep hemoglobin above 7     Acute pulmonary edema (HCC)- (present on admission)  Assessment & Plan  On IV Lasix  echocardiogram is ordered for evaluation       VTE prophylaxis: SCDs/TEDs and enoxaparin ppx  Problem Representation:    No new Assessment & Plan notes have been filed under this hospital service since the last note was generated.  Service: Internal Medicine       VTE prophylaxis: enoxaparin ppx    I have performed a physical exam and reviewed and updated ROS and Plan today (8/28/2024). In review of yesterday's note (8/27/2024), there are no changes except as documented above.

## 2024-08-28 NOTE — ED NOTES
Attempted to call report to floor.  Pt. Tolerating blood transfusion without any s/s of rxn.  Pt. Has family at bedside for support.

## 2024-08-28 NOTE — ED NOTES
Report to ROSEY Bran.  Pt. Being transported to floor by ED RN at this time in stable condition.  Blood transfusion remains in process.

## 2024-08-29 ENCOUNTER — APPOINTMENT (OUTPATIENT)
Dept: CARDIOLOGY | Facility: MEDICAL CENTER | Age: 44
End: 2024-08-29
Attending: INTERNAL MEDICINE
Payer: MEDICAID

## 2024-08-29 VITALS
HEART RATE: 88 BPM | HEIGHT: 59 IN | SYSTOLIC BLOOD PRESSURE: 142 MMHG | TEMPERATURE: 98.1 F | WEIGHT: 223.99 LBS | DIASTOLIC BLOOD PRESSURE: 90 MMHG | RESPIRATION RATE: 20 BRPM | OXYGEN SATURATION: 95 % | BODY MASS INDEX: 45.16 KG/M2

## 2024-08-29 LAB
ALBUMIN SERPL BCP-MCNC: 3.9 G/DL (ref 3.2–4.9)
ALBUMIN/GLOB SERPL: 1.1 G/DL
ALP SERPL-CCNC: 88 U/L (ref 30–99)
ALT SERPL-CCNC: 67 U/L (ref 2–50)
ANION GAP SERPL CALC-SCNC: 13 MMOL/L (ref 7–16)
AST SERPL-CCNC: 76 U/L (ref 12–45)
BILIRUB SERPL-MCNC: 0.5 MG/DL (ref 0.1–1.5)
BUN SERPL-MCNC: 16 MG/DL (ref 8–22)
CALCIUM ALBUM COR SERPL-MCNC: 9.2 MG/DL (ref 8.5–10.5)
CALCIUM SERPL-MCNC: 9.1 MG/DL (ref 8.5–10.5)
CHLORIDE SERPL-SCNC: 97 MMOL/L (ref 96–112)
CO2 SERPL-SCNC: 22 MMOL/L (ref 20–33)
CREAT SERPL-MCNC: 0.73 MG/DL (ref 0.5–1.4)
ERYTHROCYTE [DISTWIDTH] IN BLOOD BY AUTOMATED COUNT: 55.4 FL (ref 35.9–50)
GFR SERPLBLD CREATININE-BSD FMLA CKD-EPI: 104 ML/MIN/1.73 M 2
GLOBULIN SER CALC-MCNC: 3.4 G/DL (ref 1.9–3.5)
GLUCOSE SERPL-MCNC: 173 MG/DL (ref 65–99)
HCT VFR BLD AUTO: 32.4 % (ref 37–47)
HGB BLD-MCNC: 9.5 G/DL (ref 12–16)
LV EJECT FRACT  99904: 67
LV EJECT FRACT MOD 2C 99903: 69.34
LV EJECT FRACT MOD 4C 99902: 69.23
LV EJECT FRACT MOD BP 99901: 67.13
MCH RBC QN AUTO: 20.3 PG (ref 27–33)
MCHC RBC AUTO-ENTMCNC: 29.3 G/DL (ref 32.2–35.5)
MCV RBC AUTO: 69.4 FL (ref 81.4–97.8)
PLATELET # BLD AUTO: 358 K/UL (ref 164–446)
PMV BLD AUTO: 10 FL (ref 9–12.9)
POTASSIUM SERPL-SCNC: 3.6 MMOL/L (ref 3.6–5.5)
PROT SERPL-MCNC: 7.3 G/DL (ref 6–8.2)
RBC # BLD AUTO: 4.67 M/UL (ref 4.2–5.4)
SODIUM SERPL-SCNC: 132 MMOL/L (ref 135–145)
WBC # BLD AUTO: 8 K/UL (ref 4.8–10.8)

## 2024-08-29 PROCEDURE — 700102 HCHG RX REV CODE 250 W/ 637 OVERRIDE(OP)

## 2024-08-29 PROCEDURE — 85027 COMPLETE CBC AUTOMATED: CPT

## 2024-08-29 PROCEDURE — 700105 HCHG RX REV CODE 258: Performed by: STUDENT IN AN ORGANIZED HEALTH CARE EDUCATION/TRAINING PROGRAM

## 2024-08-29 PROCEDURE — 93306 TTE W/DOPPLER COMPLETE: CPT

## 2024-08-29 PROCEDURE — A9270 NON-COVERED ITEM OR SERVICE: HCPCS

## 2024-08-29 PROCEDURE — 36415 COLL VENOUS BLD VENIPUNCTURE: CPT

## 2024-08-29 PROCEDURE — 80053 COMPREHEN METABOLIC PANEL: CPT

## 2024-08-29 PROCEDURE — 700111 HCHG RX REV CODE 636 W/ 250 OVERRIDE (IP): Performed by: STUDENT IN AN ORGANIZED HEALTH CARE EDUCATION/TRAINING PROGRAM

## 2024-08-29 PROCEDURE — 700111 HCHG RX REV CODE 636 W/ 250 OVERRIDE (IP): Mod: JZ

## 2024-08-29 PROCEDURE — 99239 HOSP IP/OBS DSCHRG MGMT >30: CPT | Performed by: INTERNAL MEDICINE

## 2024-08-29 PROCEDURE — 93306 TTE W/DOPPLER COMPLETE: CPT | Mod: 26 | Performed by: INTERNAL MEDICINE

## 2024-08-29 RX ORDER — IBUPROFEN 400 MG/1
400 TABLET, FILM COATED ORAL EVERY 6 HOURS PRN
Qty: 30 TABLET | Refills: 0 | Status: SHIPPED | OUTPATIENT
Start: 2024-08-29 | End: 2024-09-12

## 2024-08-29 RX ORDER — GUAIFENESIN 600 MG/1
600 TABLET, EXTENDED RELEASE ORAL EVERY 12 HOURS
Qty: 28 TABLET | Refills: 0 | Status: SHIPPED | OUTPATIENT
Start: 2024-08-29 | End: 2024-09-12

## 2024-08-29 RX ORDER — ALBUTEROL SULFATE 90 UG/1
2 INHALANT RESPIRATORY (INHALATION) EVERY 6 HOURS PRN
Qty: 8.5 G | Refills: 0 | Status: SHIPPED | OUTPATIENT
Start: 2024-08-29

## 2024-08-29 RX ADMIN — ALBUTEROL SULFATE 2 PUFF: 90 AEROSOL, METERED RESPIRATORY (INHALATION) at 04:46

## 2024-08-29 RX ADMIN — FUROSEMIDE 40 MG: 10 INJECTION, SOLUTION INTRAVENOUS at 14:08

## 2024-08-29 RX ADMIN — ALBUTEROL SULFATE 2 PUFF: 90 AEROSOL, METERED RESPIRATORY (INHALATION) at 02:00

## 2024-08-29 RX ADMIN — SODIUM CHLORIDE 250 MG: 9 INJECTION, SOLUTION INTRAVENOUS at 06:19

## 2024-08-29 RX ADMIN — ENOXAPARIN SODIUM 40 MG: 100 INJECTION SUBCUTANEOUS at 09:28

## 2024-08-29 RX ADMIN — ALBUTEROL SULFATE 2 PUFF: 90 AEROSOL, METERED RESPIRATORY (INHALATION) at 09:29

## 2024-08-29 RX ADMIN — ALBUTEROL SULFATE 2 PUFF: 90 AEROSOL, METERED RESPIRATORY (INHALATION) at 14:09

## 2024-08-29 RX ADMIN — FUROSEMIDE 40 MG: 10 INJECTION, SOLUTION INTRAVENOUS at 06:00

## 2024-08-29 ASSESSMENT — ENCOUNTER SYMPTOMS
DIZZINESS: 0
WHEEZING: 1
CHILLS: 1
SPUTUM PRODUCTION: 1
SHORTNESS OF BREATH: 1
FEVER: 1
COUGH: 1
PALPITATIONS: 0
FLANK PAIN: 0

## 2024-08-29 ASSESSMENT — PAIN DESCRIPTION - PAIN TYPE
TYPE: ACUTE PAIN
TYPE: ACUTE PAIN

## 2024-08-29 ASSESSMENT — FIBROSIS 4 INDEX: FIB4 SCORE: 0.74

## 2024-08-29 NOTE — CARE PLAN
The patient is Stable - Low risk of patient condition declining or worsening    Shift Goals  Clinical Goals: Is and Os  Patient Goals: Shower and rest  Family Goals: SHILPI    Progress made toward(s) clinical / shift goals:    Problem: Care Map:  Day 2 Optimal Outcome for the Heart Failure Patient  Goal: Day 2:  Optimal Care of the heart failure patient  Outcome: Progressing     Problem: Communication  Goal: The ability to communicate needs accurately and effectively will improve  Outcome: Progressing     Problem: Psychosocial Needs:  Goal: Level of anxiety will decrease  Outcome: Progressing     Problem: Fluid Volume:  Goal: Will maintain balanced intake and output  Outcome: Progressing     Problem: Fall Risk  Goal: Patient will remain free from falls  Outcome: Progressing       Patient is not progressing towards the following goals:

## 2024-08-29 NOTE — PROGRESS NOTES
[facility] Internal Medicine Daily Progress Note    Date of Service  8/29/2024    [employer] Team: R IM Blue Team   Attending: Chelsi Hancock M.d.  Senior Resident: Dr. Smith   Intern:  Dr. Cruz     Chief Complaint  Lina Alfaro is a 44 y.o. female admitted 8/27/2024 with fluid overload.     Hospital Course  Lina Alfaro is a 44 y.o. female with no significant past medical history who presented 8/27/2024 with worsening shortness of breath for last 3 days associated fever, productive cough, generalized weakness.  She does report of heavy menstrual cycles for almost 5 years now.  She has seen OB/GYN in the past .On arrival to ED patient is febrile Tmax 101.5, tachycardic, hypertensive.  Labs with normal white count, hemoglobin 6.9, renal function stable COVID-19 PCR positive, chest x-ray with cardiomegaly, interstitial edema . trace bilateral feet effusion.  EKG sinus tachycardia. Patient  admitted to the hospital for further evaluation and management for volume overload, symptomatic anemia.Need close monitoring of blood counts, electrolytes and renal function due to potential of organ dysfunction due to ongoing volume overload, anemia.  Requiring IV PRBC transfusion for symptomatic anemia.Requiring IV Lasix need close monitoring for toxicity. High risk of deterioration /Arrhythmias need to be monitor closely under telemetry.       8/28  Patient today states she has dyspnea, fevers, chills, all since this past Sunday. Patient also has a cough with yellow phlegm, and body aches. Patient denies chest pain, dizziness/lightheadedness, urinary changes.     Interval Problem Update    Consultants/Specialty    Code Status  Full Code    Disposition  Medically Cleared  I have placed the appropriate orders for post-discharge needs.    Review of Systems  Review of Systems   Constitutional:  Positive for chills, fever and malaise/fatigue.   Respiratory:  Positive for cough, sputum production, shortness of breath  and wheezing.    Cardiovascular:  Negative for chest pain and palpitations.   Genitourinary:  Negative for dysuria, flank pain, frequency, hematuria and urgency.   Neurological:  Negative for dizziness.        Physical Exam  Temp:  [36 °C (96.8 °F)-36.4 °C (97.5 °F)] 36.2 °C (97.2 °F)  Pulse:  [] 97  Resp:  [16-22] 16  BP: (106-140)/(63-91) 106/74  SpO2:  [90 %-96 %] 93 %    Physical Exam  HENT:      Head: Normocephalic and atraumatic.      Nose: Congestion and rhinorrhea present.   Pulmonary:      Effort: Respiratory distress present.      Breath sounds: Wheezing present.   Skin:     Coloration: Skin is not jaundiced.      Findings: No bruising or lesion.   Neurological:      General: No focal deficit present.         Fluids    Intake/Output Summary (Last 24 hours) at 8/29/2024 0711  Last data filed at 8/29/2024 0641  Gross per 24 hour   Intake 1899.45 ml   Output 1000 ml   Net 899.45 ml       Laboratory  Recent Labs     08/28/24  0049 08/28/24  0940 08/28/24  1704   WBC 6.2 6.7 8.7   RBC 4.43 4.52 4.61   HEMOGLOBIN 8.8* 9.1* 9.1*   HEMATOCRIT 30.6* 30.8* 31.2*   MCV 69.1* 68.1* 67.7*   MCH 19.9* 20.1* 19.7*   MCHC 28.8* 29.5* 29.2*   RDW 55.0* 54.1* 52.8*   PLATELETCT 301 317 362   MPV 10.3 10.1 10.2     Recent Labs     08/27/24  1406 08/28/24  0049   SODIUM 135 139   POTASSIUM 3.6 3.3*   CHLORIDE 101 100   CO2 22 23   GLUCOSE 144* 164*   BUN 3* 6*   CREATININE 0.52 0.56   CALCIUM 9.2 9.4     Imaging  US-EXTREMITY VENOUS LOWER BILAT   Final Result      DX-CHEST-PORTABLE (1 VIEW)   Final Result      1. Cardiomegaly, interstitial edema and trace bilateral pleural effusions, compatible with congestive heart failure and/or volume overload.   2. The remainder is stable.      EC-ECHOCARDIOGRAM COMPLETE W/O CONT    (Results Pending)        Assessment/Plan      * Volume overload  Dyspnea  Assessment & Plan  Patient with worsening shortness of breath  No prior cardiac history  Chest x-ray noted with cardiomegaly,  interstitial edema  Dimer, CRP ordered   US of lower extremity bilaterally to r/o DVT    Started on Lasix 40mg TID, monitor potassium and magnesium level   Supplement O2 as needed  Fluid restriction   Daily strict input and output  Daily weight  Serial troponin/CK-MB/EKG  Monitor Intake/Output, Daily Weights  ECHO rule out LVD    Low Iron  -IV iron     Hypokalemia   -Oral potassium supplement      COVID-19  Assessment & Plan  On contact precaution  On room air  Continue monitor oxygen requirement  Patient is febrile, currently no concern for bacterial pneumonia  Procalcitonin .11      Symptomatic anemia  Assessment & Plan  Likely blood loss anemia from ongoing chronic dysmenorrhea  She denies melena, hematemesis, currently no concern of GI bleed  Low Iron   Receiving unit PRBC  Monitor H&H closely, transfuse to keep hemoglobin above 7     Acute pulmonary edema (HCC)- (present on admission)  Assessment & Plan  On IV Lasix  echocardiogram is ordered for evaluation       VTE prophylaxis: SCDs/TEDs and enoxaparin ppx  Problem Representation:    No new Assessment & Plan notes have been filed under this hospital service since the last note was generated.  Service: Internal Medicine       VTE prophylaxis: enoxaparin ppx    I have performed a physical exam and reviewed and updated ROS and Plan today (8/29/2024). In review of yesterday's note (8/28/2024), there are no changes except as documented above.

## 2024-08-29 NOTE — CARE PLAN
Problem: Knowledge Deficit - Standard  Goal: Patient and family/care givers will demonstrate understanding of plan of care, disease process/condition, diagnostic tests and medications    Assess knowledge level of disease process/condition, treatment plan, diagnostic tests and medications 4. Explain disease process/condition, treatment plan, diagnostic tests and medications      Problem: Psychosocial Needs:  Goal: Level of anxiety will decrease  Intervention: Identify and develop with patient strategies to cope with anxiety triggers       Problem: Fluid Volume  Goal: Fluid volume balance will be maintained     Monitor intake and output as ordered   Report inadequate intake or output to physician   Weights per provider order      The patient is Stable - Low risk of patient condition declining or worsening    Shift Goals  Clinical Goals: Monitor labs,  monitor inatke and ouput, daily weights  Patient Goals: comfort and POC  Family Goals: N/A    Progress made toward(s) clinical / shift goals:      Patient is not progressing towards the following goals:

## 2024-08-29 NOTE — DISCHARGE SUMMARY
Discharge Summary    CHIEF COMPLAINT ON ADMISSION  Chief Complaint   Patient presents with    Shortness of Breath     X 2 days. Patient reports using her sons albuterol with no improvement.         Reason for Admission  Flu Like Symptoms     Admission Date  8/27/2024    CODE STATUS  Full Code    HPI & HOSPITAL COURSE  This is a 44 y.o. female past medical history of heavy menorrhagia, presented 8/27/2024 with shortness of breath, fever, productive cough, generalized weakness.  She was febrile emergency room 101.5, tachycardic, hypertensive.  Hemoglobin 6.9, COVID-19 was positive.  Chest x-ray possible cardiomegaly with interstitial edema.  proBNP 365.  She was started on Lasix,  She did receive 1 units of blood transfusion.  Iron studies purely iron deficiency anemia due to heavy menorrhagia.  She has a GYN physician and primary care that she follows.  We started iron infusions and patient needs to follow-up with her primary care.  Echocardiogram also completed and showed ejection fraction 67%, normal study.  I believe her symptoms were related to the COVID-19 and symptomatic anemia.  Respiratory status is stable and there is no need for steroids.   LFTs were slightly evaded AST 76, ALT 67.  To follow-up with primary care and monitor closely.  She is high risk for GUZMAN.  We also did screening for DVT studies and were negative.  D-dimers were low.  Procalcitonin is normal there is no need for antibiotics    Therefore, she is discharged in good and stable condition to home with close outpatient follow-up.    The patient met 2-midnight criteria for an inpatient stay at the time of discharge.    Discharge Date  8/29/2024    FOLLOW UP ITEMS POST DISCHARGE  Follow-up with primary care to repeat lab work in 2 weeks    DISCHARGE DIAGNOSES  Principal Problem:    Volume overload (POA: Unknown)  Active Problems:    Acute pulmonary edema (HCC) (POA: Yes)    Symptomatic anemia (POA: Unknown)    COVID-19 (POA: Unknown)  Resolved  Problems:    * No resolved hospital problems. *      FOLLOW UP  Future Appointments   Date Time Provider Department Center   9/10/2024  9:30 AM Dillan Paz M.D. [facility] [place]     [facility]  [address]  [place] [address]  [phone]  Follow up in 2 week(s)        MEDICATIONS ON DISCHARGE     Medication List        CONTINUE taking these medications        Instructions   acetaminophen 500 MG Tabs  Commonly known as: Tylenol   Take 1,000 mg by mouth every 6 hours as needed for Mild Pain. 500 mg x 2 = 1000 mg  Dose: 1,000 mg     albuterol 108 (90 Base) MCG/ACT Aers inhalation aerosol   Inhale 2 Puffs every 6 hours as needed for Shortness of Breath.  Dose: 2 Puff     fish oil 1000 MG Caps capsule   Take 1,000 mg by mouth every day.  Dose: 1,000 mg     therapeutic multivitamin-minerals Tabs   Take 1 Tablet by mouth every day.  Dose: 1 Tablet              Allergies  Allergies   Allergen Reactions    Nkda [No Known Drug Allergy]        DIET  Orders Placed This Encounter   Procedures    Diet Order Diet: 2 Gram Sodium     Standing Status:   Standing     Number of Occurrences:   1     Order Specific Question:   Diet:     Answer:   2 Gram Sodium [7]       ACTIVITY  As tolerated.  Weight bearing as tolerated    CONSULTATIONS  None    PROCEDURES  None    LABORATORY  Lab Results   Component Value Date    SODIUM 132 (L) 08/29/2024    POTASSIUM 3.6 08/29/2024    CHLORIDE 97 08/29/2024    CO2 22 08/29/2024    GLUCOSE 173 (H) 08/29/2024    BUN 16 08/29/2024    CREATININE 0.73 08/29/2024        Lab Results   Component Value Date    WBC 8.0 08/29/2024    HEMOGLOBIN 9.5 (L) 08/29/2024    HEMATOCRIT 32.4 (L) 08/29/2024    PLATELETCT 358 08/29/2024        Total time of the discharge process exceeds 33 minutes.

## 2024-08-30 NOTE — DOCUMENTATION QUERY
"                                                                         [facility]                                                                       Query Response Note      PATIENT:               RADHA DURBIN  ACCT #:                  [id number]  MRN:                     [id number]  :                      1980  ADMIT DATE:       2024 1:43 PM  DISCH DATE:        2024 6:11 PM  RESPONDING  PROVIDER #:        [id number]           QUERY TEXT:    Anemia due to unspecified blood loss is documented in the medical record.  Please specify the acuity of the blood loss.    The patient's Clinical Indicators include:  44 year old female admitted for COVID and acute pulmonary edema.     Hingorasharda/Ziu \"Symptomatic anemia. Likely blood loss anemia from ongoing chronic dysmenorrhea\"       Sah  \"Symptomatic anemia. Likely blood loss anemia from ongoing chronic dysmenorrhea\"     HGB 6.7   HGB 8.8    Risk factors: chronic dysmenorrhea  Treatment: labs, Receiving unit PRBC, PPI    If you have any questions, please contact:  Diamante Franks RN CDI [facility]  [email or web address]  Diamante Franks [address]    Note: If you agree with a diagnosis listed, please remember to include it in your concurrent daily documentation and onto the Discharge Summary.  Options provided:   -- Acute blood loss anemia   -- Chronic blood loss anemia   -- Other explanation, (please specify other explanation)      Query created by: Diamante Franks on 2024 10:56 AM    RESPONSE TEXT:    Chronic blood loss anemia          Electronically signed by:  CHARLES TRAVIS MD 2024 6:11 PM              "

## 2024-08-30 NOTE — PROGRESS NOTES
"BP (!) 142/90   Pulse 88   Temp 36.7 °C (98.1 °F) (Temporal)   Resp 20   Ht 1.499 m (4' 11\")   Wt 102 kg (223 lb 15.8 oz)   SpO2 95%     Patient AAO*4, per patient, she is having a little pain in the arm due to PIV infiltration ice was applied.     Discussed with patient all d/c instructions and follow up appointments. Patient verbalized understandings.    @ 1816, patient was escorted off the unit via WC.   "

## 2024-09-01 LAB
BACTERIA BLD CULT: NORMAL
SIGNIFICANT IND 70042: NORMAL
SITE SITE: NORMAL
SOURCE SOURCE: NORMAL

## 2024-09-18 ENCOUNTER — APPOINTMENT (OUTPATIENT)
Dept: INTERNAL MEDICINE | Facility: OTHER | Age: 44
End: 2024-09-18
Payer: MEDICAID

## 2025-04-02 ENCOUNTER — HOSPITAL ENCOUNTER (EMERGENCY)
Facility: MEDICAL CENTER | Age: 45
End: 2025-04-02
Attending: EMERGENCY MEDICINE
Payer: MEDICAID

## 2025-04-02 VITALS
TEMPERATURE: 97.4 F | WEIGHT: 231.48 LBS | BODY MASS INDEX: 46.67 KG/M2 | OXYGEN SATURATION: 98 % | SYSTOLIC BLOOD PRESSURE: 153 MMHG | RESPIRATION RATE: 16 BRPM | HEIGHT: 59 IN | HEART RATE: 95 BPM | DIASTOLIC BLOOD PRESSURE: 95 MMHG

## 2025-04-02 DIAGNOSIS — R73.9 HYPERGLYCEMIA: ICD-10-CM

## 2025-04-02 DIAGNOSIS — D64.9 CHRONIC ANEMIA: ICD-10-CM

## 2025-04-02 DIAGNOSIS — N93.9 VAGINAL BLEEDING: ICD-10-CM

## 2025-04-02 LAB
ALBUMIN SERPL BCP-MCNC: 3.8 G/DL (ref 3.2–4.9)
ALBUMIN/GLOB SERPL: 1.1 G/DL
ALP SERPL-CCNC: 114 U/L (ref 30–99)
ALT SERPL-CCNC: 77 U/L (ref 2–50)
ANION GAP SERPL CALC-SCNC: 12 MMOL/L (ref 7–16)
AST SERPL-CCNC: 70 U/L (ref 12–45)
BASOPHILS # BLD AUTO: 0.6 % (ref 0–1.8)
BASOPHILS # BLD: 0.04 K/UL (ref 0–0.12)
BILIRUB SERPL-MCNC: 0.3 MG/DL (ref 0.1–1.5)
BUN SERPL-MCNC: 11 MG/DL (ref 8–22)
CALCIUM ALBUM COR SERPL-MCNC: 9.2 MG/DL (ref 8.5–10.5)
CALCIUM SERPL-MCNC: 9 MG/DL (ref 8.5–10.5)
CHLORIDE SERPL-SCNC: 96 MMOL/L (ref 96–112)
CO2 SERPL-SCNC: 23 MMOL/L (ref 20–33)
CREAT SERPL-MCNC: 0.81 MG/DL (ref 0.5–1.4)
EOSINOPHIL # BLD AUTO: 0.15 K/UL (ref 0–0.51)
EOSINOPHIL NFR BLD: 2.3 % (ref 0–6.9)
ERYTHROCYTE [DISTWIDTH] IN BLOOD BY AUTOMATED COUNT: 52.8 FL (ref 35.9–50)
EST. AVERAGE GLUCOSE BLD GHB EST-MCNC: 266 MG/DL
GFR SERPLBLD CREATININE-BSD FMLA CKD-EPI: 92 ML/MIN/1.73 M 2
GLOBULIN SER CALC-MCNC: 3.4 G/DL (ref 1.9–3.5)
GLUCOSE SERPL-MCNC: 382 MG/DL (ref 65–99)
HBA1C MFR BLD: 10.9 % (ref 4–5.6)
HCG SERPL QL: NEGATIVE
HCT VFR BLD AUTO: 31.2 % (ref 37–47)
HGB BLD-MCNC: 9.6 G/DL (ref 12–16)
IMM GRANULOCYTES # BLD AUTO: 0.03 K/UL (ref 0–0.11)
IMM GRANULOCYTES NFR BLD AUTO: 0.5 % (ref 0–0.9)
LYMPHOCYTES # BLD AUTO: 1.12 K/UL (ref 1–4.8)
LYMPHOCYTES NFR BLD: 16.8 % (ref 22–41)
MCH RBC QN AUTO: 23.5 PG (ref 27–33)
MCHC RBC AUTO-ENTMCNC: 30.8 G/DL (ref 32.2–35.5)
MCV RBC AUTO: 76.3 FL (ref 81.4–97.8)
MONOCYTES # BLD AUTO: 0.31 K/UL (ref 0–0.85)
MONOCYTES NFR BLD AUTO: 4.7 % (ref 0–13.4)
NEUTROPHILS # BLD AUTO: 5.01 K/UL (ref 1.82–7.42)
NEUTROPHILS NFR BLD: 75.1 % (ref 44–72)
NRBC # BLD AUTO: 0 K/UL
NRBC BLD-RTO: 0 /100 WBC (ref 0–0.2)
PLATELET # BLD AUTO: 359 K/UL (ref 164–446)
PMV BLD AUTO: 10 FL (ref 9–12.9)
POTASSIUM SERPL-SCNC: 4.4 MMOL/L (ref 3.6–5.5)
PROT SERPL-MCNC: 7.2 G/DL (ref 6–8.2)
RBC # BLD AUTO: 4.09 M/UL (ref 4.2–5.4)
SODIUM SERPL-SCNC: 131 MMOL/L (ref 135–145)
WBC # BLD AUTO: 6.7 K/UL (ref 4.8–10.8)

## 2025-04-02 PROCEDURE — 99284 EMERGENCY DEPT VISIT MOD MDM: CPT

## 2025-04-02 PROCEDURE — 80053 COMPREHEN METABOLIC PANEL: CPT

## 2025-04-02 PROCEDURE — 83036 HEMOGLOBIN GLYCOSYLATED A1C: CPT

## 2025-04-02 PROCEDURE — 36415 COLL VENOUS BLD VENIPUNCTURE: CPT

## 2025-04-02 PROCEDURE — 85025 COMPLETE CBC W/AUTO DIFF WBC: CPT

## 2025-04-02 PROCEDURE — 84703 CHORIONIC GONADOTROPIN ASSAY: CPT

## 2025-04-02 ASSESSMENT — FIBROSIS 4 INDEX: FIB4 SCORE: 1.12

## 2025-04-02 ASSESSMENT — PAIN DESCRIPTION - PAIN TYPE: TYPE: ACUTE PAIN

## 2025-04-02 NOTE — ED TRIAGE NOTES
"Chief Complaint   Patient presents with    Weakness     Began February progressively worsening weakness/fatigue/lightheaded, at hosp Sept for similar symptoms and diagnosed with anemi/low HGB given RBC transfusion, recommended come to [facility] if similar symptoms began again    Vaginal Bleeding     Heavy bleeding since last Wedn, 12+ tampons per day        Ambulated to triage with family for above complaint.    Past Medical History:   Diagnosis Date    Allergy     see list.    Anemia 8/27/2024       Vaginal bleeding protocols ordered. Pt brought to [facility] office for blood draw. Pt educated of triage process and possible wait times. Pt informed to contact staff if status changes or with any developing concerns, pt returned to [facility].     BP (!) 174/100   Pulse (!) 116   Temp 36.4 °C (97.5 °F) (Temporal)   Resp 16   Ht 1.499 m (4' 11\")   Wt 105 kg (231 lb 7.7 oz)   SpO2 97%   BMI 46.75 kg/m²      "

## 2025-04-02 NOTE — ED PROVIDER NOTES
ER Provider Note    Scribed for Fidel Sharma M.d. by Petra Gary. 4/2/2025  2:58 PM    Primary Care Provider: Pcp Pt States None    CHIEF COMPLAINT  Chief Complaint   Patient presents with    Weakness     Began February progressively worsening weakness/fatigue/lightheaded, at hosp Sept for similar symptoms and diagnosed with anemi/low HGB given RBC transfusion, recommended come to hosp if similar symptoms began again    Vaginal Bleeding     Heavy bleeding since last Wedn, 12+ tampons per day     EXTERNAL RECORDS REVIEWED  Inpatient Notes Patient has a history of transfusion related to heavy vaginal bleeding.     HPI/ROS  LIMITATION TO HISTORY   Select: : None    OUTSIDE HISTORIAN(S):  None    Lina Mouna Alfaro is a 44 y.o. female with a history of heavy menstrual bleeding who presents to the ED complaining of vaginal bleeding onset a couple of days ago. The patient reports that she started her menstrual cycle one week ago. She describes her bleeding as beginning heavy then becoming lighter for a few days. However, 2 days ago her bleeding began to increase and become heavier. She notes that she has to change her tampon every 3 hours. She has associated loss of energy, lightheadedness, and weakness. She denies having any loss of consciousness or fall related to her weakness. The patient reports that she required a transfusion last September due to her heavy menstrual bleeding. She explains that her heavy vaginal bleeding began after the removal of her IUD. She also began taking iron supplements after her transfusion. She does not have a PCP, but has an appointment on the 29th of April to be established. Denies having an OB-GYN. Denies being sexually active. The patient does not have any known allergies to medications.    PAST MEDICAL HISTORY  Past Medical History:   Diagnosis Date    Allergy     see list.    Anemia 8/27/2024       SURGICAL HISTORY  History reviewed. No pertinent surgical  "history.    FAMILY HISTORY  Family History   Problem Relation Age of Onset    Diabetes Mother     Diabetes Maternal Grandmother     Alcohol/Drug Father         Alcoholic abuse    Diabetes Maternal Aunt     Hypertension Paternal Aunt     Diabetes Maternal Aunt        SOCIAL HISTORY   reports that she quit smoking about 14 years ago. Her smoking use included cigarettes. She has never used smokeless tobacco. She reports current alcohol use. She reports that she does not use drugs.    CURRENT MEDICATIONS  Current Outpatient Medications   Medication Instructions    acetaminophen (TYLENOL) 1,000 mg, Oral, EVERY 6 HOURS PRN, 500 mg x 2 = 1000 mg     albuterol 108 (90 Base) MCG/ACT Aero Soln inhalation aerosol 2 Puffs, Inhalation, EVERY 6 HOURS PRN    fish oil 1,000 mg, Oral, DAILY    therapeutic multivitamin-minerals (THERAGRAN-M) Tab 1 Tablet, Oral, DAILY         ALLERGIES  Nkda [no known drug allergy]    PHYSICAL EXAM  VITAL SIGNS: BP (!) 174/100   Pulse (!) 116   Temp 36.4 °C (97.5 °F) (Temporal)   Resp 16   Ht 1.499 m (4' 11\")   Wt 105 kg (231 lb 7.7 oz)   SpO2 97%   BMI 46.75 kg/m²   Pulse ox interpretation: I interpret this pulse ox as normal.  Constitutional: Alert in no apparent distress.  HENT: No signs of trauma, Bilateral external ears normal, Nose normal. Slightly pale lips and mucosa  Eyes: Pupils are equal and reactive, Conjunctiva normal, Non-icteric.   Neck: Normal range of motion, Supple, No stridor.    Cardiovascular: Tachycardic heart rate. Normal peripheral perfusion  Thorax & Lungs: Unlabored respirations, equal chest expansion, no accessory muscle use  Abdomen: Non-distended  Skin:  No erythema, No rash.   Back: Normal alignment and ROM  Extremities: No gross deformity  Musculoskeletal: Good range of motion in all major joints.   Neurologic: Alert, Normal motor function, No focal deficits noted.   Psychiatric: Affect normal, Judgment normal, Mood normal.    DIAGNOSTIC STUDIES    Labs:   Labs " Reviewed   CBC WITH DIFFERENTIAL - Abnormal; Notable for the following components:       Result Value    RBC 4.09 (*)     Hemoglobin 9.6 (*)     Hematocrit 31.2 (*)     MCV 76.3 (*)     MCH 23.5 (*)     MCHC 30.8 (*)     RDW 52.8 (*)     Neutrophils-Polys 75.10 (*)     Lymphocytes 16.80 (*)     All other components within normal limits   COMP METABOLIC PANEL - Abnormal; Notable for the following components:    Sodium 131 (*)     Glucose 382 (*)     AST(SGOT) 70 (*)     ALT(SGPT) 77 (*)     Alkaline Phosphatase 114 (*)     All other components within normal limits   HCG QUAL SERUM   ESTIMATED GFR   HEMOGLOBIN A1C       EKG:   I have independently interpreted this EKG  Results for orders placed or performed during the hospital encounter of 24   EKG   Result Value Ref Range    Report       [facility] Emergency [facility].    Test Date:  2024  Pt Name:    RADHA TIRADO          Department: ER  MRN:        [id number]                      Room:  Gender:     Female                       Technician: [id number]  :        1980                   Requested By:ER TRIAGE PROTOCOL  Order #:    [id number]                    Reading MD: Dillon Carrasco    Measurements  Intervals                                Axis  Rate:       110                          P:          31  OK:         136                          QRS:        16  QRSD:       68                           T:          34  QT:         350  QTc:        474    Interpretive Statements  Sinus tachycardia  Probable left atrial enlargement  Compared to ECG 2023 11:50:51  Sinus rhythm no longer present  Electronically Signed On 2024 14:33:51 PDT by Dillon Carrasco         COURSE & MEDICAL DECISION MAKING     INITIAL ASSESSMENT, COURSE AND PLAN  Care Narrative:     2:58 PM Patient presents to the ED with vaginal bleeding. Patient evaluated at bedside and discussed plan of care, including labs. Ordered for hemoglobin A1C, CBC w/ diff, CMP, and HCG  Qual Serum to evaluate her symptoms. Differential diagnoses include but not limited to: Symptomatic anemia in the setting of chronic heavy menstrual cycles with or without need for transfusion.    4:04 PM - Patient was reevaluated at bedside. Discussed lab results with the patient and informed them of having a blood glucose indicative of diabetes.  She does have anemia, but is well above transfusion level, so I suspect that at least to some degree, her hyperglycemia, almost certainly true diabetes, given previous elevated glucose measurements and body habitus, is contributing to her feeling unwell.  She has a primary care appointment later this month.  She will continue iron supplements, and I will start her on titration of metformin up to 1000 mg twice daily.  Discussed plan for discharge, including plan for follow-up, and informed them to return to the [facility] ED with any new or worsening symptoms. Patient was given the opportunity for questions, and I addressed all questions or concerns. She is stable for discharge at this time. Patient verbalizes understanding and support with my plan for discharge.    ADDITIONAL PROBLEM MANAGED  New discussion of diabetes.  Patient was not aware that she might be diabetic.    DISPOSITION AND DISCUSSIONS  I have discussed management of the patient with the following physicians and JEFF's:  None    Discussion of management with other QHP or appropriate source(s): None     Escalation of care considered, and ultimately not performed: acute inpatient care management, however at this time, the patient is most appropriate for outpatient management.    Barriers to care at this time, including but not limited to: Patient does not have established PCP.     Decision tools and prescription drugs considered including, but not limited to: Medication modification was performed, to start the patient on metformin .    The patient will return for new or worsening symptoms and is stable at the time  of discharge.    The patient is referred to a primary physician for blood pressure management, diabetic screening, and for all other preventative health concerns.    DISPOSITION:  Patient will be discharged home in stable condition.    FOLLOW UP:  [facility] [address]  [place] [address]  [phone]  Schedule an appointment as soon as possible for a visit         OUTPATIENT MEDICATIONS:  Discharge Medication List as of 4/2/2025  4:22 PM        START taking these medications    Details   metFORMIN (GLUCOPHAGE) 500 MG Tab Take 1 Tablet by mouth every day for 5 days, THEN 1 Tablet 2 times a day with meals for 5 days, THEN 2 Tablets 2 times a day with meals for 30 days., Disp-135 Tablet, R-0, Normal              FINAL DIAGNOSIS  1. Chronic anemia    2. Hyperglycemia    3. Vaginal bleeding        Petra HWANG (Franck), am scribing for, and in the presence of, Fidel Sharma M.D..    Electronically signed by: Petra Gary (Franck), 4/2/2025    IFidel M.D. personally performed the services described in this documentation, as scribed by Petra Gary in my presence, and it is both accurate and complete.

## 2025-04-02 NOTE — ED NOTES
Pt given discharge instructions/prescription sent to pharm of choosing/ home care instructions explained, pt verbalized understanding of instructions given/pt understands the importance of follow up, pt ambulatory to ER bee.

## 2025-04-02 NOTE — DISCHARGE INSTRUCTIONS
You do not need a blood transfusion, would like you to see the women's health group to consider options for managing heavy vaginal bleeding causing anemia.  Use the phone number here to call to schedule an appointment.    Your blood sugar is very high.  You may need other oral medications or insulin, but we will start you on a first medicine, called metformin.  Follow the directions to gradually increase your dose, the way it is written on your prescription.  This helps avoid side effects such as stomach upset, which happened to a lot of people when they start this medication, but gets better over time.

## 2025-04-03 ENCOUNTER — TELEPHONE (OUTPATIENT)
Dept: OBGYN | Facility: CLINIC | Age: 45
End: 2025-04-03
Payer: MEDICAID

## 2025-04-03 NOTE — TELEPHONE ENCOUNTER
4/3/2025 1001  Pt seen in the ER for heavy menstrual bleeding since IUD removal. Consulted with VENUS Aguirre who stated ok to schedule with midwife.    Called pt and asked if she wanted to establish care with us to f/u on heavy bleeding. Scheduled on 4/24/2025 at 1015 with VENUS Ayon.  Address to clinic given as well as check in time and late policy. Pt agreed and verbalized understanding.

## 2025-04-09 NOTE — Clinical Note
REFERRAL APPROVAL NOTICE         Sent on April 9, 2025                   Linayasir Alfaro  [address] [address]                   Dear Ms. Marquise Alfaro,    After a careful review of the medical information and benefit coverage, [facility] has processed your referral. See below for additional details.    If applicable, you must be actively enrolled with your insurance for coverage of the authorized service. If you have any questions regarding your coverage, please contact your insurance directly.    REFERRAL INFORMATION   Referral #:  [id number]  Referred-To Department    Referred-By Provider:  Gynecology    Fidel Sharma M.D.   [facility]      [address] [place] [address] Emergency Room  [address]  [facility] [id number] [phone] [address] [facility] [facility] [address]  [facility] [id number] [phone]    Referral Start Date:  04/02/2025  Referral End Date:   04/02/2026             SCHEDULING  If you do not already have an appointment, please call [phone] to make an appointment.     MORE INFORMATION  If you do not already have a [employer] account, sign up at: [email or web address]  You can access your medical information, make appointments, see lab results, billing information, and more.  If you have questions regarding this referral, please contact  the [facility] Referrals department at:             [phone]. Monday - Friday 8:00AM - 5:00PM.     Sincerely,    [facility]

## 2025-04-24 ENCOUNTER — APPOINTMENT (OUTPATIENT)
Dept: OBGYN | Facility: CLINIC | Age: 45
End: 2025-04-24
Payer: MEDICAID

## 2025-05-20 ENCOUNTER — APPOINTMENT (OUTPATIENT)
Dept: OBGYN | Facility: CLINIC | Age: 45
End: 2025-05-20
Attending: EMERGENCY MEDICINE
Payer: MEDICAID

## 2025-06-17 ENCOUNTER — APPOINTMENT (OUTPATIENT)
Dept: RADIOLOGY | Facility: MEDICAL CENTER | Age: 45
End: 2025-06-17
Payer: MEDICAID

## 2025-06-17 ENCOUNTER — HOSPITAL ENCOUNTER (EMERGENCY)
Facility: MEDICAL CENTER | Age: 45
End: 2025-06-17
Attending: STUDENT IN AN ORGANIZED HEALTH CARE EDUCATION/TRAINING PROGRAM
Payer: MEDICAID

## 2025-06-17 VITALS
HEIGHT: 59 IN | OXYGEN SATURATION: 96 % | DIASTOLIC BLOOD PRESSURE: 73 MMHG | TEMPERATURE: 97.5 F | SYSTOLIC BLOOD PRESSURE: 127 MMHG | WEIGHT: 229.72 LBS | RESPIRATION RATE: 20 BRPM | HEART RATE: 104 BPM | BODY MASS INDEX: 46.31 KG/M2

## 2025-06-17 DIAGNOSIS — R06.00 DYSPNEA, UNSPECIFIED TYPE: ICD-10-CM

## 2025-06-17 DIAGNOSIS — R05.1 ACUTE COUGH: Primary | ICD-10-CM

## 2025-06-17 DIAGNOSIS — D64.9 CHRONIC ANEMIA: ICD-10-CM

## 2025-06-17 LAB
ALBUMIN SERPL BCP-MCNC: 3.9 G/DL (ref 3.2–4.9)
ALBUMIN/GLOB SERPL: 1.1 G/DL
ALP SERPL-CCNC: 108 U/L (ref 30–99)
ALT SERPL-CCNC: 61 U/L (ref 2–50)
ANION GAP SERPL CALC-SCNC: 13 MMOL/L (ref 7–16)
ANISOCYTOSIS BLD QL SMEAR: ABNORMAL
AST SERPL-CCNC: 102 U/L (ref 12–45)
BASOPHILS # BLD AUTO: 0.6 % (ref 0–1.8)
BASOPHILS # BLD: 0.04 K/UL (ref 0–0.12)
BILIRUB SERPL-MCNC: 0.5 MG/DL (ref 0.1–1.5)
BUN SERPL-MCNC: 7 MG/DL (ref 8–22)
CALCIUM ALBUM COR SERPL-MCNC: 8.7 MG/DL (ref 8.5–10.5)
CALCIUM SERPL-MCNC: 8.6 MG/DL (ref 8.5–10.5)
CHLORIDE SERPL-SCNC: 100 MMOL/L (ref 96–112)
CO2 SERPL-SCNC: 20 MMOL/L (ref 20–33)
COMMENT 1642: NORMAL
CREAT SERPL-MCNC: 0.54 MG/DL (ref 0.5–1.4)
DACRYOCYTES BLD QL SMEAR: NORMAL
EKG IMPRESSION: NORMAL
EOSINOPHIL # BLD AUTO: 0.14 K/UL (ref 0–0.51)
EOSINOPHIL NFR BLD: 2.1 % (ref 0–6.9)
ERYTHROCYTE [DISTWIDTH] IN BLOOD BY AUTOMATED COUNT: 47.7 FL (ref 35.9–50)
FLUAV RNA SPEC QL NAA+PROBE: NEGATIVE
FLUBV RNA SPEC QL NAA+PROBE: NEGATIVE
GFR SERPLBLD CREATININE-BSD FMLA CKD-EPI: 116 ML/MIN/1.73 M 2
GLOBULIN SER CALC-MCNC: 3.5 G/DL (ref 1.9–3.5)
GLUCOSE SERPL-MCNC: 252 MG/DL (ref 65–99)
HCT VFR BLD AUTO: 27 % (ref 37–47)
HGB BLD-MCNC: 7.5 G/DL (ref 12–16)
HYPOCHROMIA BLD QL SMEAR: ABNORMAL
IMM GRANULOCYTES # BLD AUTO: 0.06 K/UL (ref 0–0.11)
IMM GRANULOCYTES NFR BLD AUTO: 0.9 % (ref 0–0.9)
LYMPHOCYTES # BLD AUTO: 1.57 K/UL (ref 1–4.8)
LYMPHOCYTES NFR BLD: 24.1 % (ref 22–41)
MACROCYTES BLD QL SMEAR: ABNORMAL
MCH RBC QN AUTO: 18.5 PG (ref 27–33)
MCHC RBC AUTO-ENTMCNC: 27.8 G/DL (ref 32.2–35.5)
MCV RBC AUTO: 66.7 FL (ref 81.4–97.8)
MICROCYTES BLD QL SMEAR: ABNORMAL
MONOCYTES # BLD AUTO: 0.34 K/UL (ref 0–0.85)
MONOCYTES NFR BLD AUTO: 5.2 % (ref 0–13.4)
MORPHOLOGY BLD-IMP: NORMAL
NEUTROPHILS # BLD AUTO: 4.37 K/UL (ref 1.82–7.42)
NEUTROPHILS NFR BLD: 67.1 % (ref 44–72)
NRBC # BLD AUTO: 0.05 K/UL
NRBC BLD-RTO: 0.8 /100 WBC (ref 0–0.2)
NT-PROBNP SERPL IA-MCNC: 199 PG/ML (ref 0–125)
OVALOCYTES BLD QL SMEAR: NORMAL
PLATELET # BLD AUTO: 332 K/UL (ref 164–446)
PLATELET BLD QL SMEAR: NORMAL
PMV BLD AUTO: 9.4 FL (ref 9–12.9)
POIKILOCYTOSIS BLD QL SMEAR: NORMAL
POTASSIUM SERPL-SCNC: 3.5 MMOL/L (ref 3.6–5.5)
PROT SERPL-MCNC: 7.4 G/DL (ref 6–8.2)
RBC # BLD AUTO: 4.05 M/UL (ref 4.2–5.4)
RBC BLD AUTO: PRESENT
RSV RNA SPEC QL NAA+PROBE: NEGATIVE
SARS-COV-2 RNA RESP QL NAA+PROBE: NOTDETECTED
SODIUM SERPL-SCNC: 133 MMOL/L (ref 135–145)
STOMATOCYTES BLD QL SMEAR: NORMAL
TROPONIN T SERPL-MCNC: 7 NG/L (ref 6–19)
WBC # BLD AUTO: 6.5 K/UL (ref 4.8–10.8)

## 2025-06-17 PROCEDURE — 0241U HCHG SARS-COV-2 COVID-19 NFCT DS RESP RNA 4 TRGT ED POC: CPT

## 2025-06-17 PROCEDURE — 99284 EMERGENCY DEPT VISIT MOD MDM: CPT

## 2025-06-17 PROCEDURE — 80053 COMPREHEN METABOLIC PANEL: CPT

## 2025-06-17 PROCEDURE — 84484 ASSAY OF TROPONIN QUANT: CPT

## 2025-06-17 PROCEDURE — 85025 COMPLETE CBC W/AUTO DIFF WBC: CPT

## 2025-06-17 PROCEDURE — 700102 HCHG RX REV CODE 250 W/ 637 OVERRIDE(OP): Mod: UD | Performed by: STUDENT IN AN ORGANIZED HEALTH CARE EDUCATION/TRAINING PROGRAM

## 2025-06-17 PROCEDURE — 36415 COLL VENOUS BLD VENIPUNCTURE: CPT

## 2025-06-17 PROCEDURE — 93005 ELECTROCARDIOGRAM TRACING: CPT | Mod: TC | Performed by: STUDENT IN AN ORGANIZED HEALTH CARE EDUCATION/TRAINING PROGRAM

## 2025-06-17 PROCEDURE — A9270 NON-COVERED ITEM OR SERVICE: HCPCS | Mod: UD | Performed by: STUDENT IN AN ORGANIZED HEALTH CARE EDUCATION/TRAINING PROGRAM

## 2025-06-17 PROCEDURE — 93005 ELECTROCARDIOGRAM TRACING: CPT | Mod: TC

## 2025-06-17 PROCEDURE — 71045 X-RAY EXAM CHEST 1 VIEW: CPT

## 2025-06-17 PROCEDURE — 83880 ASSAY OF NATRIURETIC PEPTIDE: CPT

## 2025-06-17 RX ORDER — BENZONATATE 100 MG/1
100 CAPSULE ORAL 3 TIMES DAILY PRN
Qty: 30 CAPSULE | Refills: 0 | Status: SHIPPED | OUTPATIENT
Start: 2025-06-17

## 2025-06-17 RX ORDER — FERROUS SULFATE 325(65) MG
325 TABLET, DELAYED RELEASE (ENTERIC COATED) ORAL DAILY
Qty: 30 TABLET | Refills: 1 | Status: SHIPPED | OUTPATIENT
Start: 2025-06-17

## 2025-06-17 RX ORDER — AMOXICILLIN 500 MG/1
1000 CAPSULE ORAL 3 TIMES DAILY
Qty: 30 CAPSULE | Refills: 0 | Status: ACTIVE | OUTPATIENT
Start: 2025-06-17 | End: 2025-06-22

## 2025-06-17 RX ORDER — BENZONATATE 100 MG/1
100 CAPSULE ORAL ONCE
Status: COMPLETED | OUTPATIENT
Start: 2025-06-17 | End: 2025-06-17

## 2025-06-17 RX ADMIN — BENZONATATE 100 MG: 100 CAPSULE ORAL at 05:07

## 2025-06-17 ASSESSMENT — FIBROSIS 4 INDEX: FIB4 SCORE: 0.98

## 2025-06-17 NOTE — ED NOTES
Discharge instructions given.  All questions answered.  Prescriptions sent to pt's preferred pharmacy.  Pt to follow-up with PCP, return to ER if symptoms worsen as discussed.  Pt verbalized understanding.  All belongings with pt.  Pt ambulatory to lobby.

## 2025-06-17 NOTE — ED TRIAGE NOTES
"Chief Complaint   Patient presents with    Shortness of Breath     Pt reports cough with associated SOB x2 weeks. Pt reports productive cough, lingering even while taking OTC meds.     Cough     SOB protocol ordered, POCT running.     Pt is alert and oriented, speaking in full sentences, follows commands and responds appropriately to questions. Resperations are even and unlabored.     BP (!) 215/180   Pulse (!) 119   Temp 36.4 °C (97.5 °F) (Temporal)   Resp (!) 22   Ht 1.499 m (4' 11\")   Wt 104 kg (229 lb 11.5 oz)   SpO2 97%      "

## 2025-06-17 NOTE — ED PROVIDER NOTES
ED Provider Note    CHIEF COMPLAINT  Chief Complaint   Patient presents with    Shortness of Breath     Pt reports cough with associated SOB x2 weeks. Pt reports productive cough, lingering even while taking OTC meds.     Cough       LIMITATION TO HISTORY   Select: None    HPI    Lina Alfaro is a 45 y.o. female who presents to the Emergency Department for evaluation of acute productive lingering cough with associated shortness of breath onset 2 weeks ago. She states that she is coughing up a substantial amount of phlegm. Endorses some leg swelling. Denies fever. Patient reports that she has tried over the counter medications with no alleviation. Denies PO abnormalities. She notes that she had COVID recently, and that she may still be experiencing effects from it. Denies tobacco use. She states that she typically sleeps on her left side. Denies medications at home.    OUTSIDE HISTORIAN(S):  Select: Patient's son is present at bedside.    EXTERNAL RECORDS REVIEWED  Select: Patient was admitted to the hospital on 8/27/24 for shortness of breath, and treated for volume overload.    PAST MEDICAL HISTORY  Past Medical History[1]    SURGICAL HISTORY  Past Surgical History[2]    FAMILY HISTORY  Family History   Problem Relation Age of Onset    Diabetes Mother     Diabetes Maternal Grandmother     Alcohol/Drug Father         Alcoholic abuse    Diabetes Maternal Aunt     Hypertension Paternal Aunt     Diabetes Maternal Aunt         SOCIAL HISTORY  Social History     Socioeconomic History    Marital status: Single     Spouse name: Not on file    Number of children: Not on file    Years of education: Not on file    Highest education level: Not on file   Occupational History    Not on file   Tobacco Use    Smoking status: Former     Current packs/day: 0.00     Types: Cigarettes     Quit date: 6/15/2010     Years since quitting: 15.0    Smokeless tobacco: Never    Tobacco comments:     only smoked for 2  "months.2 cigs a day.   Vaping Use    Vaping status: Never Used   Substance and Sexual Activity    Alcohol use: Yes     Comment: occ    Drug use: No    Sexual activity: Yes     Partners: Male     Comment: none. Planned pregnancy   Other Topics Concern    Not on file   Social History Narrative    Not on file     Social Drivers of Health     Financial Resource Strain: Not on file   Food Insecurity: Patient Declined (8/28/2024)    Hunger Vital Sign     Worried About Running Out of Food in the Last Year: Patient declined     Ran Out of Food in the Last Year: Patient declined   Transportation Needs: No Transportation Needs (8/28/2024)    PRAPARE - Transportation     Lack of Transportation (Medical): No     Lack of Transportation (Non-Medical): No   Physical Activity: Not on file   Stress: Not on file   Social Connections: Not on file   Intimate Partner Violence: Not At Risk (8/28/2024)    Humiliation, Afraid, Rape, and Kick questionnaire     Fear of Current or Ex-Partner: No     Emotionally Abused: No     Physically Abused: No     Sexually Abused: No   Housing Stability: Low Risk  (8/28/2024)    Housing Stability Vital Sign     Unable to Pay for Housing in the Last Year: No     Number of Times Moved in the Last Year: 0     Homeless in the Last Year: No       CURRENT MEDICATIONS  Medications Ordered Prior to Encounter[3]    ALLERGIES  Allergies[4]    PHYSICAL EXAM  VITAL SIGNS:/68   Pulse (!) 103   Temp 36.4 °C (97.5 °F) (Temporal)   Resp (!) 22   Ht 1.499 m (4' 11\")   Wt 104 kg (229 lb 11.5 oz)   LMP  (LMP Unknown)   SpO2 94%   BMI 46.40 kg/m²       GENERAL: Awake and alert, vigorously coughing  HEAD: Normocephalic and atraumatic  NECK: Normal range of motion, without meningismus  EYES: Pupils Equal, Round, Reactive to Light, extraocular movements intact, conjunctiva white  ENT: Mucous membranes moist, oropharynx clear  PULMONARY: Normal effort, clear to auscultation  CARDIOVASCULAR: Tachycardic, no murmurs, " clicks or rubs, peripheral pulses 2+  ABDOMINAL: Soft, non-tender, no guarding or rigidity present, no pulsatile masses  BACK: no midline tenderness, no costovertebral tenderness  NEUROLOGICAL: Grossly non-focal neurological examination, speech normal, gait normal  EXTREMITIES: No edema, normal to inspection  SKIN: Warm and dry.  PSYCHIATRIC: Affect is appropriate    DIAGNOSTIC STUDIES / PROCEDURES  EKG  I have independently interpreted this EKG  Results for orders placed or performed during the hospital encounter of 25   EKG   Result Value Ref Range    Report       [facility] Emergency Dept.    Test Date:  2025  Pt Name:    RADHA TIRADO          Department: ER  MRN:        [id number]                      Room:  Gender:     Female                       Technician: [id number]  :        1980                   Requested By:ER TRIAGE PROTOCOL  Order #:    [id number]                    Reading MD:    Measurements  Intervals                                Axis  Rate:       112                          P:          49  SC:         164                          QRS:        17  QRSD:       69                           T:          20  QT:         327  QTc:        447    Interpretive Statements  Sinus tachycardia  Borderline T wave abnormalities  Compared to ECG 2024 13:28:27  T-wave abnormality now present          LABS  Labs Reviewed   CBC WITH DIFFERENTIAL - Abnormal; Notable for the following components:       Result Value    RBC 4.05 (*)     Hemoglobin 7.5 (*)     Hematocrit 27.0 (*)     MCV 66.7 (*)     MCH 18.5 (*)     MCHC 27.8 (*)     Nucleated RBC 0.80 (*)     Hypochromia 2+ (*)     Anisocytosis 3+ (*)     Microcytosis 3+ (*)     All other components within normal limits   COMP METABOLIC PANEL - Abnormal; Notable for the following components:    Sodium 133 (*)     Potassium 3.5 (*)     Glucose 252 (*)     Bun 7 (*)     AST(SGOT) 102 (*)     ALT(SGPT) 61 (*)     Alkaline  Phosphatase 108 (*)     All other components within normal limits   PROBRAIN NATRIURETIC PEPTIDE, NT - Abnormal; Notable for the following components:    NT-proBNP 199 (*)     All other components within normal limits   TROPONIN   ESTIMATED GFR   PLATELET ESTIMATE   MORPHOLOGY   PERIPHERAL SMEAR REVIEW   DIFFERENTIAL COMMENT   POCT COV-2, FLU A/B, RSV BY PCR   POC COV-2, FLU A/B, RSV BY PCR     All labs reviewed by me.     RADIOLOGY  I have independently interpreted the diagnostic imaging associated with this visit and am waiting the final reading from the radiologist.   My preliminary interpretation is as follows: No obvious pneumonia    Formal Radiologist interpretation:  DX-CHEST-PORTABLE (1 VIEW)   Final Result         1.  No acute cardiopulmonary disease.          COURSE & MEDICAL DECISION MAKING    ED COURSE:    INTERVENTIONS BY ME:  Medications   benzonatate (Tessalon) capsule 100 mg (100 mg Oral Given 6/17/25 0507)       Response on recheck:    4:18 AM - Patient seen and examined at bedside.      - Patient was reevaluated at bedside. I informed the patient that her labs and imaging are reassuring. Discussed the plan for discharge with strict return protocol. Patient is agreeable to the plan.      507 AM patient's heart rate 102 at this time she is comfortable still coughing, discussed her anemia she has been noncompliant with her iron pills she has not had any recent vaginal bleeding she has an appointment with her gynecologist in July to discuss hysterectomy discussed blood transfusion.      INITIAL ASSESSMENT, COURSE AND PLAN  Care Narrative:     This is a 45-year-old female with a history of chronic anemia presenting with two weeks of productive cough and dyspnea. She was recently hospitalized for volume overload, but her current presentation lacks clinical, laboratory, or radiographic findings concerning for acute decompensated heart failure or pneumonia. Her exam reveals normal oxygenation, mild  tachycardia, and a clear pulmonary exam. Her EKG shows sinus tachycardia with new T-wave changes, but troponin is negative and there are no ischemic symptoms. I feel her tachycardia is driven by her persistent coughing; she is not comfortable when coughing, and this alone may be contributing to her elevated heart rate.    Additionally, her labs demonstrate moderate anemia, which are consistent with chronic iron deficiency. She has no active bleeding and is noncompliant with iron supplements. I do feel her anemia is contributing to her symptoms of fatigue and dyspnea. Her heart rate at discharge remained 102, which I believe is a compensatory response both to her cough and underlying anemia. She is PERC negative other than slight HR elevation and has no signs of PE, ACS, or sepsis. I do not see a sinister cause for her mild tachycardia. She is stable for outpatient management with supportive care and close follow-up.    ADDITIONAL PROBLEM LIST      DISPOSITION AND DISCUSSIONS  Discussion of management with other QHP or appropriate source(s): RT    I have discussed management of the patient with the following physicians and JEFF's: None    Escalation of care considered, and ultimately not performed:acute inpatient care management, however at this time, the patient is most appropriate for outpatient management    Barriers to care at this time, including but not limited to: Patient does not have established PCP.     Decision tools and prescription drugs considered including, but not limited to:      The patient will return for new or worsening symptoms and is stable at the time of discharge.    The patient is referred to a primary physician for blood pressure management, diabetic screening, and for all other preventative health concerns.    DISPOSITION:  Patient will be discharged home in stable condition.    FOLLOW UP:  [facility], Emergency Dept  [address]  [id number]  [phone]    If symptoms worsen, If you develop worrisome symptoms      OUTPATIENT MEDICATIONS:  New Prescriptions    AMOXICILLIN (AMOXIL) 500 MG CAP    Take 2 Capsules by mouth 3 times a day for 5 days.    BENZONATATE (TESSALON) 100 MG CAP    Take 1 Capsule by mouth 3 times a day as needed for Cough.        FINAL DIAGNOSIS  1. Acute cough    2. Chronic anemia    3. Dyspnea, unspecified type        I, Hussein Fitzpatrick (Radhaibe), am scribing for, and in the presence of, Gennaro Catalan.    Electronically signed by: Hussein Fitzpatrick (Scribe), 6/17/2025    I, Gennaro Catalan personally performed the services described in this documentation, as scribed by Hussein Fitzpatrick in my presence, and it is both accurate and complete.     Electronically signed by: Gennaro Catalan DO ,5:18 AM 06/17/25          [1]   Past Medical History:  Diagnosis Date    Allergy     see list.    Anemia 8/27/2024   [2] History reviewed. No pertinent surgical history.  [3]   No current facility-administered medications on file prior to encounter.     Current Outpatient Medications on File Prior to Encounter   Medication Sig Dispense Refill    albuterol 108 (90 Base) MCG/ACT Aero Soln inhalation aerosol Inhale 2 Puffs every 6 hours as needed for Shortness of Breath. 8.5 g 0    Omega-3 Fatty Acids (FISH OIL) 1000 MG Cap capsule Take 1,000 mg by mouth every day.      therapeutic multivitamin-minerals (THERAGRAN-M) Tab Take 1 Tablet by mouth every day.      acetaminophen (TYLENOL) 500 MG Tab Take 1,000 mg by mouth every 6 hours as needed for Mild Pain. 500 mg x 2 = 1000 mg     [4]   Allergies  Allergen Reactions    Nkda [No Known Drug Allergy]